# Patient Record
Sex: FEMALE | Race: OTHER | Employment: OTHER | URBAN - METROPOLITAN AREA
[De-identification: names, ages, dates, MRNs, and addresses within clinical notes are randomized per-mention and may not be internally consistent; named-entity substitution may affect disease eponyms.]

---

## 2020-06-29 ENCOUNTER — OFFICE VISIT (OUTPATIENT)
Dept: CARDIOLOGY CLINIC | Age: 70
End: 2020-06-29

## 2020-06-29 VITALS
HEIGHT: 62 IN | WEIGHT: 217 LBS | TEMPERATURE: 98.3 F | SYSTOLIC BLOOD PRESSURE: 126 MMHG | HEART RATE: 77 BPM | BODY MASS INDEX: 39.93 KG/M2 | DIASTOLIC BLOOD PRESSURE: 80 MMHG

## 2020-06-29 PROCEDURE — 99204 OFFICE O/P NEW MOD 45 MIN: CPT | Performed by: INTERNAL MEDICINE

## 2020-06-29 PROCEDURE — 93000 ELECTROCARDIOGRAM COMPLETE: CPT | Performed by: INTERNAL MEDICINE

## 2020-06-29 RX ORDER — CHLORTHALIDONE 25 MG/1
12.5 TABLET ORAL DAILY
COMMUNITY
End: 2020-08-25 | Stop reason: SDUPTHER

## 2020-06-29 RX ORDER — CLOPIDOGREL BISULFATE 75 MG/1
75 TABLET ORAL DAILY
COMMUNITY
End: 2020-07-30 | Stop reason: HOSPADM

## 2020-06-29 RX ORDER — AMLODIPINE BESYLATE 10 MG/1
10 TABLET ORAL DAILY
COMMUNITY
End: 2020-08-25

## 2020-06-29 RX ORDER — SPIRONOLACTONE 25 MG/1
12.5 TABLET ORAL DAILY
Qty: 45 TABLET | Refills: 3 | Status: SHIPPED | OUTPATIENT
Start: 2020-06-29 | End: 2020-08-25 | Stop reason: SDUPTHER

## 2020-06-29 RX ORDER — ATORVASTATIN CALCIUM 10 MG/1
10 TABLET, FILM COATED ORAL DAILY
COMMUNITY
End: 2020-08-25

## 2020-06-29 SDOH — HEALTH STABILITY: MENTAL HEALTH: HOW OFTEN DO YOU HAVE A DRINK CONTAINING ALCOHOL?: NEVER

## 2020-06-29 NOTE — PROGRESS NOTES
change, diaphoresis, fatigue, fever and unexpected weight change. HENT: Negative for congestion, facial swelling, mouth sores and nosebleeds. Eyes: Negative for discharge and visual disturbance. Respiratory: Negative for cough, chest tightness, shortness of breath and wheezing. Cardiovascular: Negative for chest pain, palpitations and leg swelling. Gastrointestinal: Negative for abdominal distention, abdominal pain, blood in stool and vomiting. Endocrine: Negative for cold intolerance, heat intolerance and polyuria. Genitourinary: Negative for difficulty urinating, dysuria, frequency and hematuria. Musculoskeletal: Negative for back pain, joint swelling, myalgias and neck pain. Skin: Negative for color change, pallor and rash. Allergic/Immunologic: Negative for immunocompromised state. Neurological: Negative for dizziness, syncope, weakness, light-headedness, numbness and headaches. Hematological: Negative for adenopathy. Does not bruise/bleed easily. Psychiatric/Behavioral: Negative for behavioral problems, confusion, decreased concentration and suicidal ideas. The patient is not nervous/anxious. Vitals:    06/29/20 1624   BP: 126/80   Pulse:    Temp:     Weight: 217 lb (98.4 kg)       Vitals:    06/29/20 1604 06/29/20 1624   BP: (!) 140/90 126/80   Site: Left Upper Arm Left Upper Arm   Position: Supine Sitting   Cuff Size: Large Adult Large Adult   Pulse: 77    Temp: 98.3 °F (36.8 °C)    Weight: 217 lb (98.4 kg)    Height: 5' 2\" (1.575 m)        BP Readings from Last 3 Encounters:   06/29/20 126/80       Wt Readings from Last 3 Encounters:   06/29/20 217 lb (98.4 kg)       Physical Exam  Constitutional:       General: She is not in acute distress. Appearance: She is well-developed. She is not diaphoretic. HENT:      Head: Normocephalic and atraumatic. Eyes:      Pupils: Pupils are equal, round, and reactive to light. Neck:      Musculoskeletal: Normal range of motion.

## 2020-06-30 PROBLEM — R60.0 LEG EDEMA: Status: ACTIVE | Noted: 2020-06-30

## 2020-06-30 PROBLEM — E78.2 MIXED HYPERLIPIDEMIA: Status: ACTIVE | Noted: 2020-06-30

## 2020-06-30 PROBLEM — I10 ESSENTIAL HYPERTENSION: Status: ACTIVE | Noted: 2020-06-30

## 2020-06-30 PROBLEM — I49.5 SSS (SICK SINUS SYNDROME) (HCC): Status: ACTIVE | Noted: 2020-06-30

## 2020-06-30 ASSESSMENT — ENCOUNTER SYMPTOMS
COUGH: 0
ABDOMINAL PAIN: 0
CHEST TIGHTNESS: 0
BLOOD IN STOOL: 0
WHEEZING: 0
COLOR CHANGE: 0
FACIAL SWELLING: 0
SHORTNESS OF BREATH: 0
ABDOMINAL DISTENTION: 0
BACK PAIN: 0
EYE DISCHARGE: 0
VOMITING: 0

## 2020-07-09 PROBLEM — Z95.0 ARTIFICIAL PACEMAKER: Status: ACTIVE | Noted: 2020-07-09

## 2020-07-10 ENCOUNTER — HOSPITAL ENCOUNTER (OUTPATIENT)
Dept: NON INVASIVE DIAGNOSTICS | Age: 70
Discharge: HOME | End: 2020-07-10

## 2020-07-10 ENCOUNTER — NURSE ONLY (OUTPATIENT)
Dept: CARDIOLOGY CLINIC | Age: 70
End: 2020-07-10

## 2020-07-10 LAB
LV EF: 35 %
LVEF MODALITY: NORMAL

## 2020-07-10 PROCEDURE — 93279 PRGRMG DEV EVAL PM/LDLS PM: CPT | Performed by: INTERNAL MEDICINE

## 2020-07-10 PROCEDURE — 93306 TTE W/DOPPLER COMPLETE: CPT

## 2020-07-10 NOTE — PROGRESS NOTES
Device interrogation by company representative. See interrogation for further details. Programmed vvir 60 bpm. Paced 79.5%. No  arrhythmias recorded. Pacer placed in [place] 3/30/17. carelink monitor ordered. See PACEART report under Cardiology tab.

## 2020-07-14 ENCOUNTER — PREP FOR PROCEDURE (OUTPATIENT)
Dept: CARDIOLOGY CLINIC | Age: 70
End: 2020-07-14

## 2020-07-14 RX ORDER — SODIUM CHLORIDE 0.9 % (FLUSH) 0.9 %
10 SYRINGE (ML) INJECTION PRN
Status: CANCELLED | OUTPATIENT
Start: 2020-07-14

## 2020-07-14 RX ORDER — ASPIRIN 81 MG/1
81 TABLET ORAL DAILY
Qty: 30 TABLET | Refills: 0 | Status: SHIPPED | OUTPATIENT
Start: 2020-07-14 | End: 2021-03-10 | Stop reason: SDUPTHER

## 2020-07-14 RX ORDER — SODIUM CHLORIDE 0.9 % (FLUSH) 0.9 %
10 SYRINGE (ML) INJECTION EVERY 12 HOURS SCHEDULED
Status: CANCELLED | OUTPATIENT
Start: 2020-07-14

## 2020-07-14 RX ORDER — ASPIRIN 325 MG
325 TABLET ORAL ONCE
Status: CANCELLED | OUTPATIENT
Start: 2020-07-30

## 2020-07-14 RX ORDER — SODIUM CHLORIDE 9 MG/ML
INJECTION, SOLUTION INTRAVENOUS CONTINUOUS
Status: CANCELLED | OUTPATIENT
Start: 2020-07-14

## 2020-07-14 NOTE — PROGRESS NOTES
Left Heart Catheterization    A left heart catheterization is a procedure that provides your cardiologist with detailed information regarding how your heart functions. A small catheter (long, fine tube) is inserted into an artery (a vessel that carries blood and oxygen) that leads to your heart. While watching with x-ray equipment, small amounts of dye are injected which enables visualization of the heart arteries and chambers. The pictures that your cardiologist receives from the cardiac catheterization enable him or her to decide on the best treatment for you. Date of the procedure:  7/30/20    Time of arrival: 0930    Cardiologist performing the procedure: _Sepideh_____________________    Instructions for your left heart catheterization:    1. Bring a list of your medications to the hospital.    2.  Please notify us before the procedure if you are allergic to anything; especially x-ray contrast dye, iodine, nickel, or any type of jewelry. This is very important! 3. Do not eat or drink anything at all after midnight (or 8 hours) prior to the procedure. 4.  Take all morning medications EXCEPT any diuretics (water pills) the day of the procedure with a small sip of water. 5.  If you are on Coumadin, Warfarin, or Jairon Factor, please notify us so that we can make adjustments to your medication. 6.  If you are taking Xarelto, Eliquis, or Pradaxa, please stop staking these medications two days prior to the procedure (including the day of the procedure). 7.  If you are diabetic, check your blood sugar in the morning. If your blood sugar is 120 or less, do not take insulin. If your blood sugar is more than 120, take half the dose of your normal insulin. Do not take Metformin the night before your procedure or morning of the procedure. 8.  You MUST have someone to drive you home--no driving for 24 hours after your procedure.   If an intervention is performed, you might stay overnight in the hospital.    9.  Discharge instructions will be given to you at the time of your procedure. 10.  For any questions or if you cannot keep this appointment for any reason, please call [phone]. Spoke with pt regarding procedure. Pre-op instructions, time and location of arrival discussed. Pt will get COVID test 5-6 days prior to procedure. Pt verbalized understanding and all questions answered at this time.

## 2020-07-24 ENCOUNTER — OFFICE VISIT (OUTPATIENT)
Dept: PRIMARY CARE CLINIC | Age: 70
End: 2020-07-24

## 2020-07-24 PROCEDURE — 99211 OFF/OP EST MAY X REQ PHY/QHP: CPT | Performed by: NURSE PRACTITIONER

## 2020-07-27 LAB
REPORT: NORMAL
SARS-COV-2: NOT DETECTED
THIS TEST SENT TO: NORMAL

## 2020-07-29 ENCOUNTER — PRE-PROCEDURE TELEPHONE (OUTPATIENT)
Dept: CARDIAC CATH/INVASIVE PROCEDURES | Age: 70
End: 2020-07-29

## 2020-07-30 ENCOUNTER — HOSPITAL ENCOUNTER (OUTPATIENT)
Dept: CARDIAC CATH/INVASIVE PROCEDURES | Age: 70
Discharge: HOME | End: 2020-08-01

## 2020-07-30 VITALS — BODY MASS INDEX: 40.12 KG/M2 | HEIGHT: 62 IN | TEMPERATURE: 98.9 F | WEIGHT: 218 LBS

## 2020-07-30 LAB
A/G RATIO: 1.7 (ref 1.1–2.2)
ALBUMIN SERPL-MCNC: 5 G/DL (ref 3.4–5)
ALP BLD-CCNC: 72 U/L (ref 40–129)
ALT SERPL-CCNC: 13 U/L (ref 10–40)
ANION GAP SERPL CALCULATED.3IONS-SCNC: 13 MMOL/L (ref 3–16)
AST SERPL-CCNC: 20 U/L (ref 15–37)
BILIRUB SERPL-MCNC: 2.8 MG/DL (ref 0–1)
BUN BLDV-MCNC: 17 MG/DL (ref 7–20)
CALCIUM SERPL-MCNC: 10.4 MG/DL (ref 8.3–10.6)
CHLORIDE BLD-SCNC: 99 MMOL/L (ref 99–110)
CO2: 28 MMOL/L (ref 21–32)
CREAT SERPL-MCNC: 1.1 MG/DL (ref 0.6–1.2)
EKG ATRIAL RATE: 88 BPM
EKG DIAGNOSIS: NORMAL
EKG P AXIS: 37 DEGREES
EKG Q-T INTERVAL: 454 MS
EKG QRS DURATION: 168 MS
EKG QTC CALCULATION (BAZETT): 503 MS
EKG R AXIS: 43 DEGREES
EKG T AXIS: -35 DEGREES
EKG VENTRICULAR RATE: 74 BPM
GFR AFRICAN AMERICAN: 60
GFR NON-AFRICAN AMERICAN: 49
GLOBULIN: 3 G/DL
GLUCOSE BLD-MCNC: 159 MG/DL (ref 70–99)
HCT VFR BLD CALC: 40.8 % (ref 36–48)
HEMOGLOBIN: 14.5 G/DL (ref 12–16)
INR BLD: 1.02 (ref 0.86–1.14)
LEFT VENTRICULAR EJECTION FRACTION MODE: NORMAL
LV EF: 40 %
MCH RBC QN AUTO: 34.3 PG (ref 26–34)
MCHC RBC AUTO-ENTMCNC: 35.4 G/DL (ref 31–36)
MCV RBC AUTO: 96.7 FL (ref 80–100)
PDW BLD-RTO: 13.1 % (ref 12.4–15.4)
PLATELET # BLD: 148 K/UL (ref 135–450)
PMV BLD AUTO: 7.7 FL (ref 5–10.5)
POTASSIUM SERPL-SCNC: 3.6 MMOL/L (ref 3.5–5.1)
PROTHROMBIN TIME: 11.8 SEC (ref 10–13.2)
RBC # BLD: 4.22 M/UL (ref 4–5.2)
SODIUM BLD-SCNC: 140 MMOL/L (ref 136–145)
TOTAL PROTEIN: 8 G/DL (ref 6.4–8.2)
WBC # BLD: 4.7 K/UL (ref 4–11)

## 2020-07-30 PROCEDURE — 6360000002 HC RX W HCPCS

## 2020-07-30 PROCEDURE — 99152 MOD SED SAME PHYS/QHP 5/>YRS: CPT

## 2020-07-30 PROCEDURE — 93005 ELECTROCARDIOGRAM TRACING: CPT | Performed by: INTERNAL MEDICINE

## 2020-07-30 PROCEDURE — C1894 INTRO/SHEATH, NON-LASER: HCPCS

## 2020-07-30 PROCEDURE — 85610 PROTHROMBIN TIME: CPT

## 2020-07-30 PROCEDURE — 2500000003 HC RX 250 WO HCPCS

## 2020-07-30 PROCEDURE — 99217 PR OBSERVATION CARE DISCHARGE MANAGEMENT: CPT | Performed by: NURSE PRACTITIONER

## 2020-07-30 PROCEDURE — 93010 ELECTROCARDIOGRAM REPORT: CPT | Performed by: INTERNAL MEDICINE

## 2020-07-30 PROCEDURE — 80053 COMPREHEN METABOLIC PANEL: CPT

## 2020-07-30 PROCEDURE — 85027 COMPLETE CBC AUTOMATED: CPT

## 2020-07-30 PROCEDURE — 93458 L HRT ARTERY/VENTRICLE ANGIO: CPT | Performed by: INTERNAL MEDICINE

## 2020-07-30 PROCEDURE — 99152 MOD SED SAME PHYS/QHP 5/>YRS: CPT | Performed by: INTERNAL MEDICINE

## 2020-07-30 PROCEDURE — 93458 L HRT ARTERY/VENTRICLE ANGIO: CPT

## 2020-07-30 PROCEDURE — C1769 GUIDE WIRE: HCPCS

## 2020-07-30 PROCEDURE — 2709999900 HC NON-CHARGEABLE SUPPLY

## 2020-07-30 PROCEDURE — 6360000004 HC RX CONTRAST MEDICATION: Performed by: INTERNAL MEDICINE

## 2020-07-30 RX ORDER — SODIUM CHLORIDE 0.9 % (FLUSH) 0.9 %
10 SYRINGE (ML) INJECTION EVERY 12 HOURS SCHEDULED
Status: DISCONTINUED | OUTPATIENT
Start: 2020-07-30 | End: 2020-07-30 | Stop reason: SDUPTHER

## 2020-07-30 RX ORDER — SODIUM CHLORIDE 9 MG/ML
INJECTION, SOLUTION INTRAVENOUS CONTINUOUS
Status: DISCONTINUED | OUTPATIENT
Start: 2020-07-30 | End: 2020-08-02 | Stop reason: HOSPADM

## 2020-07-30 RX ORDER — SODIUM CHLORIDE 0.9 % (FLUSH) 0.9 %
10 SYRINGE (ML) INJECTION EVERY 12 HOURS SCHEDULED
Status: DISCONTINUED | OUTPATIENT
Start: 2020-07-30 | End: 2020-08-02 | Stop reason: HOSPADM

## 2020-07-30 RX ORDER — SODIUM CHLORIDE 0.9 % (FLUSH) 0.9 %
10 SYRINGE (ML) INJECTION PRN
Status: DISCONTINUED | OUTPATIENT
Start: 2020-07-30 | End: 2020-08-02 | Stop reason: HOSPADM

## 2020-07-30 RX ORDER — SODIUM CHLORIDE 0.9 % (FLUSH) 0.9 %
10 SYRINGE (ML) INJECTION PRN
Status: DISCONTINUED | OUTPATIENT
Start: 2020-07-30 | End: 2020-07-30 | Stop reason: SDUPTHER

## 2020-07-30 RX ORDER — ATROPINE SULFATE 0.4 MG/ML
0.5 AMPUL (ML) INJECTION
Status: ACTIVE | OUTPATIENT
Start: 2020-07-30 | End: 2020-07-30

## 2020-07-30 RX ORDER — ASPIRIN 325 MG
325 TABLET ORAL ONCE
Status: DISCONTINUED | OUTPATIENT
Start: 2020-07-30 | End: 2020-08-02 | Stop reason: HOSPADM

## 2020-07-30 RX ORDER — LOSARTAN POTASSIUM 50 MG/1
50 TABLET ORAL DAILY
Qty: 30 TABLET | Refills: 3 | Status: SHIPPED | OUTPATIENT
Start: 2020-07-30 | End: 2020-08-25 | Stop reason: SDUPTHER

## 2020-07-30 RX ORDER — ACETAMINOPHEN 325 MG/1
650 TABLET ORAL EVERY 4 HOURS PRN
Status: DISCONTINUED | OUTPATIENT
Start: 2020-07-30 | End: 2020-08-02 | Stop reason: HOSPADM

## 2020-07-30 RX ORDER — SODIUM CHLORIDE 9 MG/ML
INJECTION, SOLUTION INTRAVENOUS CONTINUOUS
Status: ACTIVE | OUTPATIENT
Start: 2020-07-30 | End: 2020-07-30

## 2020-07-30 RX ADMIN — IOHEXOL 200 ML: 350 INJECTION, SOLUTION INTRAVENOUS at 16:07

## 2020-07-30 NOTE — PROCEDURES
Allen Leal  72 y.o.  [id number]    Referring MD:  Dr. Sandie Ervin    Indication:  MCNEILL    Mallampati Class: 3    ASA Class: 3    After informed consent was obtained and history and exam reviewed, the patient was taken to the cardiac cath lab. The patient had both groins and the right wrist prepped and draped in sterile fashion. 1% Xylocaine was used to anesthetize the right wrist.  A needle was inserted into the radial artery and a 5/6 Fr sheath was inserted. Continuous pulse-oximetry and ECG monitoring was performed, and frequent blood pressure assessment was performed. An independent trained observer pushed medications at my direction. We monitored the patient's level of consciousness and vital signs/physiologic status throughout the procedure duration (see start and stop times below). A JR4 catheter was advanced through the sheath over a guide wire and advanced under fluoroscopic guidance into the ascending aorta. The wire was removed and the right coronary artery was engaged. Digital angiograms were recorded. The catheter was then removed and a JL 3.5 catheter was then advanced over the wire into the ascending aorta. The wire was removed, and the left main artery was engaged. Digital angiograms were recorded. The catheter was then removed, and a pigtail catheter was advanced over the wire to the ascending aorta. The pigtail catheter was then placed into the left ventricle. Pressures were recorded. Ventriculography was performed. Post-ventriculography pressures and a pullback were performed. The pigtail catheter was then removed. All catheter exchanges done over a wire. A femoral angiogram was not performed.     Hemostasis was obtained by VascBand    Complications: None    Estimated blood loss: < 30 mL    Sedation: 1 mg Versed, 25 mcg Fentanyl  Sedation start: 1547  Sedation stop: 1605    Angiographic Findings:  Co dominant system  Left Main:  Normal    Left Anterior Descending:  Mild irregular plaque    Circumflex:  Mild irregular plaque    Right Coronary:  Mild irregular plaque    Left Ventriculogram:  LVEF ~40%. Distal anterior wall/apical dysfunction    Hemodynamics (mm Hg):  Left Ventricular Pressure:  143/2, 3  Central Aortic Pressure:  147/56 (87)    Conclusions:  -No obstructive CAD  -LV dysfunction with LVEF ~40% or so. Distal anterior/apical wall motion abnormality    Recommendations:  -Maximize medical therapy    Oli Malik MD, [facility]

## 2020-07-30 NOTE — DISCHARGE SUMMARY
[facility] Discharge Note      Patient ID: Kirill Solorzano 72 y.o. 1950    Admission Date: 7/30/2020     Discharge Date:  7/30/2020    Reason for Admission:  Principal Diagnosis: LV dysfunction   Secondary Diagnosis: HTN    Procedures:  Coronary angiogram     Brief Summary of Patient's Course:  72 y.o. with LV dysfunction, MCNEILL, SSS and PPM who presented for coronary angiogram. Coronary angiogram demonstrated no obstructive CAD. The LVEF was ~40% with distal, anterior/apical wall motion abnormality. She tolerated the procedure well. A TR band was used to obtain hemostasis of the right radial arteriotomy. Losartan 50 mg po daily was started. Instructed pt to check BMP in 1 week. She was monitored in the holding bay and discharged home. Discharge Condition:  Good     Discharge Instructions: Activity Limitations:  No heavy lifting. Diet:  low fat and low sodium    Follow Up Instructions: To office in: Follow up with Zohra Araujo NP in 1 week and Dr Venancio Felix in 4 weeks  Instructed Re: Discharge medications, activity and dietary restrictions and follow up appointments were discussed.      Discharge Medications:   Dilia Seals   Home Medication Instructions JAT:[id number]    Printed on:07/30/20 7760   Medication Information                      amLODIPine (NORVASC) 10 MG tablet  Take 10 mg by mouth daily             aspirin EC 81 MG EC tablet  Take 1 tablet by mouth daily             atorvastatin (LIPITOR) 10 MG tablet  Take 10 mg by mouth daily             chlorthalidone (HYGROTON) 25 MG tablet  Take 12.5 mg by mouth daily             losartan (COZAAR) 50 MG tablet  Take 1 tablet by mouth daily             spironolactone (ALDACTONE) 25 MG tablet  Take 0.5 tablets by mouth daily               Check BMP in 1 week                          Attending Physician: Dr. Georgi Silvestre   Time Spent on Discharge: greater than 30 minutes

## 2020-07-30 NOTE — PLAN OF CARE
Brief Pre-Op Note/Sedation Assessment      Jose Alfredo Cuevas  1950  Room/bed info not found      [id number]  3:38 PM    Planned Procedure: Cardiac Catheterization Procedure    Post Procedure Plan: Return to same level of care    Consent: I have discussed with the patient and/or the patient representative the indication, alternatives, and the possible risks and/or complications of the planned procedure and the anesthesia methods. The patient and/or patient representative appear to understand and agree to proceed. Chief Complaint: Dyspnea on Exertion      Indications for Cath Procedure:  Suspected CAD and LV Dysfunction  Anginal Classification within 2 weeks:  CCS III - Symptoms with everyday living activities, i.e., moderate limitation(if MCNEILL is anginal equivalent)  NYHA Heart Failure Class within 2 weeks: Class II - Symptoms of HF on ordinary exertion, Newly Diagnosed?  No, Heart Failure Type: Systolic  Is Cath Lab Visit Valve-related?: No  Surgical Risk: N/A  Functional Type: >= 4 METS without symptoms    Anti- Anginal Meds within 2 weeks:   Yes: Ca Channel Blockers, Aspirin and Statin (Any)    Stress or Imaging Studies Performed:  None     Vital Signs:  Temp 98.9 °F (37.2 °C) (Oral)   Ht 5' 2\" (1.575 m)   Wt 218 lb (98.9 kg)   BMI 39.87 kg/m²     Allergies:  No Known Allergies    Past Medical History:  Past Medical History:   Diagnosis Date    Hyperlipidemia     Hypertension          Surgical History:  Past Surgical History:   Procedure Laterality Date    PACEMAKER PLACEMENT           Medications:  Current Outpatient Medications   Medication Sig Dispense Refill    aspirin EC 81 MG EC tablet Take 1 tablet by mouth daily 30 tablet 0    amLODIPine (NORVASC) 10 MG tablet Take 10 mg by mouth daily      chlorthalidone (HYGROTON) 25 MG tablet Take 12.5 mg by mouth daily      atorvastatin (LIPITOR) 10 MG tablet Take 10 mg by mouth daily      spironolactone (ALDACTONE) 25 MG tablet Take 0.5 tablets by

## 2020-07-30 NOTE — H&P
72 y.o. here for [facility]. Has PPM and SSS. Had recent echo that showed LV dysfunction with RWMA. She was referred for cath. Past Medical History:   Diagnosis Date    Hyperlipidemia     Hypertension      Past Surgical History:   Procedure Laterality Date    PACEMAKER PLACEMENT       Social History     Tobacco Use    Smoking status: Never Smoker    Smokeless tobacco: Never Used   Substance Use Topics    Alcohol use: Never     Frequency: Never    Drug use: Not on file     No Known Allergies    No family history on file. PE:  Temperature 98.9 °F (37.2 °C), temperature source Oral, height 5' 2\" (1.575 m), weight 218 lb (98.9 kg). General (appearance): Well devel. No distress  Eyes: Anicteric  Neck: Supple. No JVD  Ears/Nose/Mouth/Thorat: No cyanosis  CV: RRR. Respiratory:  Clear, normal respiratory effort  GI: obese  Skin: Warm, dry. No rashes  Neuro/Psych: Alert and oriented x 3. Appropriate behavior  Ext:  No c/c. No pitting edema  Pulses:  2+ radial    Lab Results   Component Value Date    WBC 4.7 07/30/2020    HGB 14.5 07/30/2020    HCT 40.8 07/30/2020    MCV 96.7 07/30/2020     07/30/2020     Lab Results   Component Value Date     07/30/2020    K 3.6 07/30/2020    CL 99 07/30/2020    CO2 28 07/30/2020    BUN 17 07/30/2020    CREATININE 1.1 07/30/2020    GLUCOSE 159 (H) 07/30/2020    CALCIUM 10.4 07/30/2020    PROT 8.0 07/30/2020    LABALBU 5.0 07/30/2020    BILITOT 2.8 (H) 07/30/2020    ALKPHOS 72 07/30/2020    AST 20 07/30/2020    ALT 13 07/30/2020    LABGLOM 49 (A) 07/30/2020    GFRAA 60 (A) 07/30/2020    AGRATIO 1.7 07/30/2020    GLOB 3.0 07/30/2020     Lab Results   Component Value Date    INR 1.02 07/30/2020    PROTIME 11.8 07/30/2020 7/2020 Echo:  Left ventricular cavity size is normal. There is mild concentric left   ventricular hypertrophy. Overall left ventricular systolic function appears   moderately reduced with an ejection fraction of 35%. Abnormal setpal motion.    The apical septum and true apex are severely hypo- to akinetic. The apical   lateral wall is severely hypo- to akinetic. The anterolateral wall is mildly   hypokinetic. The anterior wall appears hypokinetic. Mild mitral regurgitation is present. Mild tricuspid regurgitation. Estimated pulmonary artery systolic pressure is at 27 mmHg assuming a right   atrial pressure of 3 mmHg. A/P:  72 y.o. here for cath. Chronic systolic chf  SSS s/p ppm  CAD (has h/o stent, she says)    Plan:  [facility] today    Marcelo Delarosa MD, [facility]

## 2020-08-06 DIAGNOSIS — I51.9 LV DYSFUNCTION: ICD-10-CM

## 2020-08-06 LAB
ANION GAP SERPL CALCULATED.3IONS-SCNC: 13 MMOL/L (ref 3–16)
BUN BLDV-MCNC: 14 MG/DL (ref 7–20)
CALCIUM SERPL-MCNC: 10.3 MG/DL (ref 8.3–10.6)
CHLORIDE BLD-SCNC: 103 MMOL/L (ref 99–110)
CO2: 27 MMOL/L (ref 21–32)
CREAT SERPL-MCNC: 1.1 MG/DL (ref 0.6–1.2)
GFR AFRICAN AMERICAN: 60
GFR NON-AFRICAN AMERICAN: 49
GLUCOSE BLD-MCNC: 112 MG/DL (ref 70–99)
POTASSIUM SERPL-SCNC: 4 MMOL/L (ref 3.5–5.1)
SODIUM BLD-SCNC: 143 MMOL/L (ref 136–145)

## 2020-08-24 NOTE — PROGRESS NOTES
[address]   Cardiac Electrophysiology Consultation   Date: 8/25/2020  Reason for Consultation:  SSS, CMP  Consult Requesting Physician: No att. providers found     Chief Complaint:   Chief Complaint   Patient presents with    New Patient     Dr. Maty George       HPI: Tere Maldonado is a 72 y.o. female referred by Dr. Eller Yosef for [facility] [address]. PMH significant for HTN, HLD, CHF, SSS s/p MDT single chamber PPM placed in [place] (3/30/17). Pt moved from [place] in 2/2020 and has established cardiology care in our office. Echo (7/2020) showed EF of 35% and RWMA, s/p LHC showed no obstructive CAD. Currently on valsartan and spironolactone, but no BB. Interval History: Today, she is being seen as a new patient for management of CMP and device. She is here today with her son. She had planned on going back to [place] last month, but her trip was cancelled. Her son is working toward her staying in the [phone]. He also plans on adding her to his insurance. She does report that she had trouble with SOB last month, but she has been feeling better lately. She also confirms occasional swelling in her LE. Device interrogation today shows normally functioning PPM with stable sensing and pacing thresholds.  98%. No atrial or ventricular arrhythmias noted. VEL at 5 yrs. Past Medical History:   Diagnosis Date    Hyperlipidemia     Hypertension         Past Surgical History:   Procedure Laterality Date    PACEMAKER PLACEMENT         Allergies:  No Known Allergies    Medication:   Prior to Admission medications    Medication Sig Start Date End Date Taking?  Authorizing Provider   losartan (COZAAR) 50 MG tablet Take 1 tablet by mouth daily 7/30/20  Yes SAMMY Mccarty - CNP   aspirin EC 81 MG EC tablet Take 1 tablet by mouth daily 7/14/20  Yes Jaden Chester MD   amLODIPine (NORVASC) 10 MG tablet Take 10 mg by mouth daily   Yes Historical Provider, MD   chlorthalidone (HYGROTON) 25 MG tablet Take 12.5 mg by mouth daily   Yes Historical Provider, MD   atorvastatin (LIPITOR) 10 MG tablet Take 10 mg by mouth daily   Yes Historical Provider, MD   spironolactone (ALDACTONE) 25 MG tablet Take 0.5 tablets by mouth daily 6/29/20  Yes Aristides Boone MD       Social History:   reports that she has never smoked. She has never used smokeless tobacco. She reports that she does not drink alcohol. Family History:  family history is not on file. Reviewed. Denies family history of sudden cardiac death, arrhythmia, premature CAD    Review of System:    · General ROS: negative for - chills, fever   · Psychological ROS: negative for - anxiety or depression  · Ophthalmic ROS: negative for - eye pain or loss of vision  · ENT ROS: negative for - epistaxis, headaches, nasal discharge, sore throat   · Allergy and Immunology ROS: negative for - hives, nasal congestion   · Hematological and Lymphatic ROS: negative for - bleeding problems, blood clots, bruising or jaundice  · Endocrine ROS: negative for - skin changes, temperature intolerance or unexpected weight changes  · Respiratory ROS: negative for - cough, hemoptysis, pleuritic pain, SOB, sputum changes or wheezing  · Cardiovascular ROS: Per HPI. · Gastrointestinal ROS: negative for - abdominal pain, blood in stools, diarrhea, hematemesis, melena, nausea/vomiting or swallowing difficulty/pain  · Genito-Urinary ROS: negative for - dysuria or incontinence  · Musculoskeletal ROS: negative for - joint swelling or muscle pain  · Neurological ROS: negative for - confusion, dizziness, gait disturbance, headaches, numbness/tingling, seizures, speech problems, tremors, visual changes or weakness  · Dermatological ROS: negative for - rash    Physical Examination:  Vitals:    08/25/20 1038   BP: 110/70   Pulse: 85   Temp: 97.3 °F (36.3 °C)       · Constitutional: Oriented. No distress. · Head: Normocephalic and atraumatic.    · Mouth/Throat: Oropharynx is clear and moist.   · Eyes: Conjunctivae normal. EOM are normal.   · Neck: Normal range of motion. Neck supple. No rigidity. No JVD present. · Cardiovascular: Normal rate, regular rhythm, S1&S2 and intact distal pulses. · Pulmonary/Chest: Bilateral respiratory sounds. No wheezes. No rhonchi. · Abdominal: Soft. Bowel sounds present. No distension, No tenderness. · Musculoskeletal: No tenderness. No edema    · Lymphadenopathy: Has no cervical adenopathy. · Neurological: Alert and oriented. Cranial nerve appears intact, No Gross deficit   · Skin: Skin is warm and dry. No rash noted. · Psychiatric: Has a normal mood, affect and behavior     Labs:  Reviewed. ECG: reviewed, , with QRS duration 152 ms. No pathologic Q waves, ventricular pre-excitation, or QT prolongation. Studies:   1. Event monitor:     2. Echo 7/10/20:   Summary   Left ventricular cavity size is normal. There is mild concentric left   ventricular hypertrophy. Overall left ventricular systolic function appears   moderately reduced with an ejection fraction of 35%. Abnormal setpal motion. The apical septum and true apex are severely hypo- to akinetic. The apical   lateral wall is severely hypo- to akinetic. The anterolateral wall is mildly   hypokinetic. The anterior wall appears hypokinetic. Mild mitral regurgitation is present. Mild tricuspid regurgitation. Estimated pulmonary artery systolic pressure is at 27 mmHg assuming a right   atrial pressure of 3 mmHg. 3. Stress Test:      4. Cath 7/30/20 (Dr. Leonel Nur): Conclusions:  -No obstructive CAD  -LV dysfunction with LVEF ~40% or so. Distal anterior/apical wall motion abnormality    The MCOT, echocardiogram, stress test, and coronary angiography/PCI were reviewed by myself and used for my plan of care. Procedures:  1. None    Assessment/Plan:  1.  SSS, s/p single chamber PPM 3/30/17  2. NICMP, possibly PPM induced?, EF 35%, on ARB  3. HTN, on losartan, chlorthalidone, and amlodipine  4.   HLD, on statin    Her underlying rhythm is complete heart block  PPM is functioning properly with high  of 98%  Cannot make any changes in her device today to minimize  as her underlying rhythm is CHB. She is symptomatic with intermittent s/s of HF  As she is currently self pay, will save any costly treatments until insurance has been established. In the meantime, will start Toprol XL 50 mg daily. Will stop amlodipine today  Will change from atorvastatin to simvastatin 40 mg due to cost  Continue losartan 50 mg daily, spironolactone 12.5 mg daily, and chlorthalidone 12.5 mg daily    After optimal medical therapy, if she continues to have low LV ejection fraction, then we will have to discuss about upgrading the device to CRT P / D based on her LVEF  The CIED was interrogated and programmed and I supervised and reviewed all the data. All findings and changes are in device interrogation sheat and reflect my personal interpretation and changes and is scanned to Epic. - The patient is counseled to follow a low salt diet to assure blood pressure remains controlled for cardiovascular risk factor modification.   - The patient is counseled to avoid excess caffeine, and energy drinks as this may exacerbated ectopy and arrhythmia. - The patient is counseled to get regular exercise 3-5 times per week to control cardiovascular risk factors. - The patient is counseled to lose weigt to control cardiovascular risk factors. -The patient is counseled about the health hazards of smoking including cardiovascular side effects and its impact on morbidity and mortality. Follow up in 3 months with me    Thank you for allowing me to participate in the care of [facility] [place]. All questions and concerns were addressed to the patient/family. Alternatives to my treatment were discussed. Prince Sheryl RN, am scribing for and in the presence of Dr. Analia Wilson.  08/25/20 10:43 AM  Valerio Lamas, DL Espinoza MD, personally performed the services prescribed in this documentation as scribed by Ms. Milvia Cavazos RN in my presence and it is both accurate and complete.        Brando Villavicencio MD  Cardiac Electrophysiology  [facility]

## 2020-08-25 ENCOUNTER — OFFICE VISIT (OUTPATIENT)
Dept: CARDIOLOGY CLINIC | Age: 70
End: 2020-08-25

## 2020-08-25 ENCOUNTER — NURSE ONLY (OUTPATIENT)
Dept: CARDIOLOGY CLINIC | Age: 70
End: 2020-08-25

## 2020-08-25 VITALS
HEART RATE: 85 BPM | WEIGHT: 216.8 LBS | DIASTOLIC BLOOD PRESSURE: 70 MMHG | BODY MASS INDEX: 39.65 KG/M2 | TEMPERATURE: 97.3 F | SYSTOLIC BLOOD PRESSURE: 110 MMHG

## 2020-08-25 PROCEDURE — 99203 OFFICE O/P NEW LOW 30 MIN: CPT | Performed by: INTERNAL MEDICINE

## 2020-08-25 PROCEDURE — 93279 PRGRMG DEV EVAL PM/LDLS PM: CPT | Performed by: INTERNAL MEDICINE

## 2020-08-25 RX ORDER — CHLORTHALIDONE 25 MG/1
12.5 TABLET ORAL DAILY
Qty: 45 TABLET | Refills: 3 | Status: SHIPPED | OUTPATIENT
Start: 2020-08-25 | End: 2020-12-03 | Stop reason: SDUPTHER

## 2020-08-25 RX ORDER — METOPROLOL SUCCINATE 50 MG/1
50 TABLET, EXTENDED RELEASE ORAL DAILY
Qty: 30 TABLET | Refills: 5 | Status: SHIPPED | OUTPATIENT
Start: 2020-08-25 | End: 2020-08-25

## 2020-08-25 RX ORDER — SIMVASTATIN 40 MG
40 TABLET ORAL NIGHTLY
Qty: 90 TABLET | Refills: 3 | Status: SHIPPED | OUTPATIENT
Start: 2020-08-25 | End: 2020-12-03 | Stop reason: SDUPTHER

## 2020-08-25 RX ORDER — METOPROLOL SUCCINATE 50 MG/1
50 TABLET, EXTENDED RELEASE ORAL DAILY
Qty: 90 TABLET | Refills: 3 | Status: SHIPPED | OUTPATIENT
Start: 2020-08-25 | End: 2020-12-03 | Stop reason: SDUPTHER

## 2020-08-25 RX ORDER — SPIRONOLACTONE 25 MG/1
12.5 TABLET ORAL DAILY
Qty: 45 TABLET | Refills: 3 | Status: SHIPPED | OUTPATIENT
Start: 2020-08-25 | End: 2020-12-03 | Stop reason: SDUPTHER

## 2020-08-25 RX ORDER — LOSARTAN POTASSIUM 50 MG/1
50 TABLET ORAL DAILY
Qty: 90 TABLET | Refills: 3 | Status: SHIPPED | OUTPATIENT
Start: 2020-08-25 | End: 2020-12-03 | Stop reason: SDUPTHER

## 2020-08-25 NOTE — PROGRESS NOTES
Device interrogation by company representative. See interrogation for further details. Patient to see Dr. Maryuri Hartman today. SC-PM.  98.1%. no VHR recordings. Follow up in 3 months via [employer]. See PACEART report under Cardiology tab.

## 2020-09-10 ENCOUNTER — OFFICE VISIT (OUTPATIENT)
Dept: CARDIOLOGY CLINIC | Age: 70
End: 2020-09-10

## 2020-09-10 VITALS — BODY MASS INDEX: 39.73 KG/M2 | HEART RATE: 97 BPM | TEMPERATURE: 97.5 F | OXYGEN SATURATION: 98 % | WEIGHT: 217.2 LBS

## 2020-09-10 PROCEDURE — 99214 OFFICE O/P EST MOD 30 MIN: CPT | Performed by: INTERNAL MEDICINE

## 2020-09-10 ASSESSMENT — ENCOUNTER SYMPTOMS
ABDOMINAL DISTENTION: 0
FACIAL SWELLING: 0
COLOR CHANGE: 0
COUGH: 0
ABDOMINAL PAIN: 0
EYE DISCHARGE: 0
WHEEZING: 0
CHEST TIGHTNESS: 0
VOMITING: 0
BLOOD IN STOOL: 0
BACK PAIN: 0
SHORTNESS OF BREATH: 0

## 2020-09-10 NOTE — PROGRESS NOTES
[address] [facility]     Outpatient Cardiology         Chief Complaint   Patient presents with    Follow-Up from [address] a 72 y.o. female here for CHF follow up. Hx of HTN, PPM single chamber placed on 4/30/2017, CHF. Chronic systolic heart failure, compensated. Will reassess LV function in 3 months, if it has not improved will defer to EP upgrading to CRT P/D  HTN, on meds, no side effect. HLD, statin, tolerating well. SSS, pacemaker working properly      PMH  Past Medical History:   Diagnosis Date    Hyperlipidemia     Hypertension        PSH  Past Surgical History:   Procedure Laterality Date    PACEMAKER PLACEMENT          Social HIstory  Social History     Tobacco Use    Smoking status: Never Smoker    Smokeless tobacco: Never Used   Substance Use Topics    Alcohol use: Never     Frequency: Never    Drug use: Not on file       Family History  History reviewed. No pertinent family history. Allergies   No Known Allergies    Medications:     Home Medications:  Were reviewed and are listed in nursing record. and/or listed below    Prior to Admission medications    Medication Sig Start Date End Date Taking?  Authorizing Provider   losartan (COZAAR) 50 MG tablet Take 1 tablet by mouth daily 8/25/20  Yes Ap De La Torre MD   spironolactone (ALDACTONE) 25 MG tablet Take 0.5 tablets by mouth daily 8/25/20  Yes Ap De La Torre MD   simvastatin (ZOCOR) 40 MG tablet Take 1 tablet by mouth nightly 8/25/20  Yes Ap De La Torre MD   metoprolol succinate (TOPROL XL) 50 MG extended release tablet Take 1 tablet by mouth daily 8/25/20  Yes Ap De La Torre MD   chlorthalidone (HYGROTON) 25 MG tablet Take 0.5 tablets by mouth daily 8/25/20  Yes Ap De La Torre MD   aspirin EC 81 MG EC tablet Take 1 tablet by mouth daily 7/14/20  Yes Jaden Chester MD        Review of Systems   Constitutional: Negative for activity change, appetite change, diaphoresis, fatigue, fever and unexpected weight change. HENT: Negative for congestion, facial swelling, mouth sores and nosebleeds. Eyes: Negative for discharge and visual disturbance. Respiratory: Negative for cough, chest tightness, shortness of breath and wheezing. Cardiovascular: Negative for chest pain, palpitations and leg swelling. Gastrointestinal: Negative for abdominal distention, abdominal pain, blood in stool and vomiting. Endocrine: Negative for cold intolerance, heat intolerance and polyuria. Genitourinary: Negative for difficulty urinating, dysuria, frequency and hematuria. Musculoskeletal: Negative for back pain, joint swelling, myalgias and neck pain. Skin: Negative for color change, pallor and rash. Allergic/Immunologic: Negative for immunocompromised state. Neurological: Negative for dizziness, syncope, weakness, light-headedness, numbness and headaches. Hematological: Negative for adenopathy. Does not bruise/bleed easily. Psychiatric/Behavioral: Negative for behavioral problems, confusion, decreased concentration and suicidal ideas. The patient is not nervous/anxious. Vitals:    09/10/20 1424   Pulse: 97   Temp: 97.5 °F (36.4 °C)   SpO2: 98%    Weight: 217 lb 3.2 oz (98.5 kg)       Vitals:    09/10/20 1424   Pulse: 97   Temp: 97.5 °F (36.4 °C)   SpO2: 98%   Weight: 217 lb 3.2 oz (98.5 kg)       BP Readings from Last 3 Encounters:   08/25/20 110/70   06/29/20 126/80       Wt Readings from Last 3 Encounters:   09/10/20 217 lb 3.2 oz (98.5 kg)   08/25/20 216 lb 12.8 oz (98.3 kg)   07/30/20 218 lb (98.9 kg)       Physical Exam  Constitutional:       General: She is not in acute distress. Appearance: She is well-developed. She is not diaphoretic. HENT:      Head: Normocephalic and atraumatic. Eyes:      Pupils: Pupils are equal, round, and reactive to light. Neck:      Musculoskeletal: Normal range of motion. VLDL  No results found for: [facility]    Lab Results   Component Value Date    INR 1.02 07/30/2020    PROTIME 11.8 07/30/2020       The ASCVD Risk score (Antoni Brasher, et al., 2013) failed to calculate for the following reasons:    Cannot find a previous HDL lab    Cannot find a previous total cholesterol lab    Unable to determine if patient is Non-       Imaging:       Last ECG (if available):  Paced    Last Monitor/Holter    Last Stress (if available):    Last Cath (if available): 7/30/20  Angiographic Findings:  Co dominant system  Left Main:  Normal     Left Anterior Descending:  Mild irregular plaque     Circumflex:  Mild irregular plaque     Right Coronary:  Mild irregular plaque     Left Ventriculogram:  LVEF ~40%. Distal anterior wall/apical dysfunction     Hemodynamics (mm Hg):  Left Ventricular Pressure:  143/2, 3  Central Aortic Pressure:  147/56 (87)     Conclusions:  -No obstructive CAD  -LV dysfunction with LVEF ~40% or so. Distal anterior/apical wall motion abnormality    Last TTE/JANIE(if available): 7/10/20   Summary   Left ventricular cavity size is normal. There is mild concentric left   ventricular hypertrophy. Overall left ventricular systolic function appears   moderately reduced with an ejection fraction of 35%. Abnormal setpal motion. The apical septum and true apex are severely hypo- to akinetic. The apical   lateral wall is severely hypo- to akinetic. The anterolateral wall is mildly   hypokinetic. The anterior wall appears hypokinetic. Mild mitral regurgitation is present. Mild tricuspid regurgitation. Estimated pulmonary artery systolic pressure is at 27 mmHg assuming a right   atrial pressure of 3 mmHg. Last CMR  (if available):      Assessment / Plan:     SSS (sick sinus syndrome) ([address]  V paced. Follows up with EP. Device working properly. Mixed hyperlipidemia  On a statin. Essential hypertension  Controlled on current medications.     LV dysfunction  She does have chronic systolic heart failure, nonischemic in nature. She is currently on losartan, metoprolol. Compensated. In 3 months will need recheck EF for possible device upgrade. I had the opportunity to review the clinical symptoms and presentation of Matt Daigle. Patient's allergies and medications were reviewed and updated. Patient's past medical, surgical, social and family history were reviewed and updated. Patient's testing including laboratory, ECGs, monitor, imaging (TTE,JANIE,CMR,cath) were reviewed. Tobacco use was discussed with the patient and educated on the negative effects. I have asked the patient to not utilize these agents. All questions and concerns were addressed to the patient/family. Alternatives to my treatment were discussed. The note was completed using EMR. Every effort wasmade to ensure accuracy; however, inadvertent computerized transcription errors may be present. Thank you for allowing me to participate in thecare or Errol Redman MD, [facility], [facility]

## 2020-09-10 NOTE — ASSESSMENT & PLAN NOTE
She does have chronic systolic heart failure, nonischemic in nature. She is currently on losartan, metoprolol. Compensated. In 3 months will need recheck EF for possible device upgrade.

## 2020-11-03 ENCOUNTER — NURSE ONLY (OUTPATIENT)
Dept: CARDIOLOGY CLINIC | Age: 70
End: 2020-11-03

## 2020-11-03 PROCEDURE — 93294 REM INTERROG EVL PM/LDLS PM: CPT | Performed by: INTERNAL MEDICINE

## 2020-11-03 PROCEDURE — 93296 REM INTERROG EVL PM/IDS: CPT | Performed by: INTERNAL MEDICINE

## 2020-11-03 NOTE — LETTER
[address] [facility] [phone]  [address] [facility]    Pacemaker/Defibrillator Clinic          11/03/20        Peterson Ignacio  [address] [facility] [address]        Dear Peterson Ignacio    This letter is to inform you that we received the transmission from your monitor at home that checks your implanted heart device. The next date you should transmit will be 3/2/2021. If your report requires attention, we will notify you. Please be aware that the remote device transmission sites are periodically monitored only during regular business hours during which simultaneous in-office device clinics are being run. If your transmission requires attention, we will contact you as soon as possible. Thank you.             [facility]

## 2020-11-04 NOTE — PROGRESS NOTES
We received remote transmission from patient's monitor at home. Transmission shows normal sensing and pacing function. EP physician will review. See interrogation under cardiology tab in the [facility] [address] field for more details. No new arrhythmias.  99.9%. Follow up in 3 months via carelink.

## 2020-12-02 NOTE — PROGRESS NOTES
tablet Take 0.5 tablets by mouth daily 12/3/20  Yes Sirisha Ang MD   aspirin EC 81 MG EC tablet Take 1 tablet by mouth daily 7/14/20  Yes Twanda Canavan, MD       Social History:   reports that she has never smoked. She has never used smokeless tobacco. She reports that she does not drink alcohol. Family History:  family history is not on file. Reviewed. Denies family history of sudden cardiac death, arrhythmia, premature CAD    Review of System:    · General ROS: negative for - chills, fever   · Psychological ROS: negative for - anxiety or depression  · Ophthalmic ROS: negative for - eye pain or loss of vision  · ENT ROS: negative for - epistaxis, headaches, nasal discharge, sore throat   · Allergy and Immunology ROS: negative for - hives, nasal congestion   · Hematological and Lymphatic ROS: negative for - bleeding problems, blood clots, bruising or jaundice  · Endocrine ROS: negative for - skin changes, temperature intolerance or unexpected weight changes  · Respiratory ROS: negative for - cough, hemoptysis, pleuritic pain, SOB, sputum changes or wheezing  · Cardiovascular ROS: Per HPI. · Gastrointestinal ROS: negative for - abdominal pain, blood in stools, diarrhea, hematemesis, melena, nausea/vomiting or swallowing difficulty/pain  · Genito-Urinary ROS: negative for - dysuria or incontinence  · Musculoskeletal ROS: negative for - joint swelling or muscle pain  · Neurological ROS: negative for - confusion, dizziness, gait disturbance, headaches, numbness/tingling, seizures, speech problems, tremors, visual changes or weakness  · Dermatological ROS: negative for - rash    Physical Examination:  Vitals:    12/03/20 0949   BP: 138/82   Pulse: 86   Temp: 97.1 °F (36.2 °C)       · Constitutional: Oriented. No distress. · Head: Normocephalic and atraumatic. · Mouth/Throat: Oropharynx is clear and moist.   · Eyes: Conjunctivae normal. EOM are normal.   · Neck: Normal range of motion. Neck supple. No rigidity. No JVD present. · Cardiovascular: Normal rate, regular rhythm, S1&S2 and intact distal pulses. · Pulmonary/Chest: Bilateral respiratory sounds. No wheezes. No rhonchi. · Abdominal: Soft. Bowel sounds present. No distension, No tenderness. · Musculoskeletal: No tenderness. No edema    · Lymphadenopathy: Has no cervical adenopathy. · Neurological: Alert and oriented. Cranial nerve appears intact, No Gross deficit   · Skin: Skin is warm and dry. No rash noted. · Psychiatric: Has a normal mood, affect and behavior     Labs:  Reviewed. ECG: reviewed, , with QRS duration 153 ms. No pathologic Q waves, ventricular pre-excitation, or QT prolongation. Studies:   1. Event monitor:     2. Echo 7/10/20:   Summary   Left ventricular cavity size is normal. There is mild concentric left   ventricular hypertrophy. Overall left ventricular systolic function appears   moderately reduced with an ejection fraction of 35%. Abnormal setpal motion. The apical septum and true apex are severely hypo- to akinetic. The apical   lateral wall is severely hypo- to akinetic. The anterolateral wall is mildly   hypokinetic. The anterior wall appears hypokinetic. Mild mitral regurgitation is present. Mild tricuspid regurgitation. Estimated pulmonary artery systolic pressure is at 27 mmHg assuming a right   atrial pressure of 3 mmHg. 3. Stress Test:      4. Cath 7/30/20 (Dr. Renaye Aase): Conclusions:  -No obstructive CAD  -LV dysfunction with LVEF ~40% or so. Distal anterior/apical wall motion abnormality    The MCOT, echocardiogram, stress test, and coronary angiography/PCI were reviewed by myself and used for my plan of care. Procedures:  1. None    Assessment/Plan:  1.  SSS, s/p single chamber PPM 3/30/17  2. NICMP, possibly PPM induced?, EF 35%, on ARB and BB  3. HTN, stable on losartan, Toprol, chlorthalidone  4.   HLD, stable on statin    Her underlying rhythm is complete heart block  PPM is functioning properly with high  of 100%  Cannot make any changes in her device today to minimize  as her underlying rhythm is CHB. She is symptomatic with intermittent s/s of HF including SOB and edema  As she is currently self pay, will save any costly treatments until insurance has been established. Continue losartan 50 mg daily, Toprol 50 mg daily, spironolactone 12.5 mg daily, and chlorthalidone 12.5 mg daily    She is currently on optimal medical therapy, so we need to repeat limited echo. If she continues to have low LV EF, then we discussed about upgrading the device to CRT P / D based on her LVEF. Follow up based on treatment     The CIED was interrogated and programmed and I supervised and reviewed all the data. All findings and changes are in device interrogation sheat and reflect my personal interpretation and changes and is scanned to Epic. - The patient is counseled to follow a low salt diet to assure blood pressure remains controlled for cardiovascular risk factor modification.   - The patient is counseled to avoid excess caffeine, and energy drinks as this may exacerbated ectopy and arrhythmia. - The patient is counseled to get regular exercise 3-5 times per week to control cardiovascular risk factors. - The patient is counseled to lose weigt to control cardiovascular risk factors. -The patient is counseled about the health hazards of smoking including cardiovascular side effects and its impact on morbidity and mortality. Follow up in 3 months with me    Thank you for allowing me to participate in the care of [facility]. All questions and concerns were addressed to the patient/family. Alternatives to my treatment were discussed. Kraig Rao RN, am scribing for and in the presence of Dr. Arvind Pabon.  12/03/20 10:39 AM  DL Mart MD  Cardiac Electrophysiology  [address]

## 2020-12-03 ENCOUNTER — NURSE ONLY (OUTPATIENT)
Dept: CARDIOLOGY CLINIC | Age: 70
End: 2020-12-03

## 2020-12-03 ENCOUNTER — OFFICE VISIT (OUTPATIENT)
Dept: CARDIOLOGY CLINIC | Age: 70
End: 2020-12-03

## 2020-12-03 VITALS
TEMPERATURE: 97.1 F | SYSTOLIC BLOOD PRESSURE: 138 MMHG | HEART RATE: 86 BPM | BODY MASS INDEX: 41.15 KG/M2 | WEIGHT: 225 LBS | DIASTOLIC BLOOD PRESSURE: 82 MMHG

## 2020-12-03 PROCEDURE — 99213 OFFICE O/P EST LOW 20 MIN: CPT | Performed by: INTERNAL MEDICINE

## 2020-12-03 PROCEDURE — 93279 PRGRMG DEV EVAL PM/LDLS PM: CPT | Performed by: INTERNAL MEDICINE

## 2020-12-03 RX ORDER — CHLORTHALIDONE 25 MG/1
12.5 TABLET ORAL DAILY
Qty: 45 TABLET | Refills: 5 | Status: SHIPPED | OUTPATIENT
Start: 2020-12-03 | End: 2021-03-10 | Stop reason: SDUPTHER

## 2020-12-03 RX ORDER — LOSARTAN POTASSIUM 50 MG/1
50 TABLET ORAL DAILY
Qty: 90 TABLET | Refills: 3 | Status: SHIPPED | OUTPATIENT
Start: 2020-12-03 | End: 2021-12-21

## 2020-12-03 RX ORDER — METOPROLOL SUCCINATE 50 MG/1
50 TABLET, EXTENDED RELEASE ORAL DAILY
Qty: 90 TABLET | Refills: 3 | Status: SHIPPED | OUTPATIENT
Start: 2020-12-03 | End: 2021-03-10 | Stop reason: SDUPTHER

## 2020-12-03 RX ORDER — SIMVASTATIN 40 MG
40 TABLET ORAL NIGHTLY
Qty: 90 TABLET | Refills: 3 | Status: SHIPPED | OUTPATIENT
Start: 2020-12-03 | End: 2021-03-10 | Stop reason: SDUPTHER

## 2020-12-03 RX ORDER — SPIRONOLACTONE 25 MG/1
12.5 TABLET ORAL DAILY
Qty: 45 TABLET | Refills: 3 | Status: SHIPPED | OUTPATIENT
Start: 2020-12-03 | End: 2021-03-10 | Stop reason: SDUPTHER

## 2020-12-03 NOTE — PROGRESS NOTES
Patient comes into the office today for a 3 month device check and ov w/UL. Interrogation of [employer] [place] single chamber pacemaker shows normal device function. All sensing and pacing parameters within normal range. 4.5 years to [facility]  Presenting  @ 61 bpm  Underlying Vs @ 30 bpm   99.9%  0 VHR detected. No changes made during this session. See Paceart report under the Cardiology tab. Patient currently takes metoprolol, ASA 81 mg    Patient will follow up in 3 months in office and/or remotely.

## 2020-12-16 ENCOUNTER — OFFICE VISIT (OUTPATIENT)
Dept: CARDIOLOGY CLINIC | Age: 70
End: 2020-12-16

## 2020-12-16 VITALS
DIASTOLIC BLOOD PRESSURE: 70 MMHG | BODY MASS INDEX: 37.05 KG/M2 | HEART RATE: 78 BPM | WEIGHT: 222.4 LBS | TEMPERATURE: 97.3 F | SYSTOLIC BLOOD PRESSURE: 100 MMHG | HEIGHT: 65 IN | OXYGEN SATURATION: 97 %

## 2020-12-16 PROCEDURE — 93000 ELECTROCARDIOGRAM COMPLETE: CPT | Performed by: INTERNAL MEDICINE

## 2020-12-16 PROCEDURE — 99214 OFFICE O/P EST MOD 30 MIN: CPT | Performed by: INTERNAL MEDICINE

## 2020-12-16 ASSESSMENT — ENCOUNTER SYMPTOMS
ABDOMINAL DISTENTION: 0
COUGH: 0
BACK PAIN: 0
BLOOD IN STOOL: 0
EYE DISCHARGE: 0
SHORTNESS OF BREATH: 0
VOMITING: 0
WHEEZING: 0
COLOR CHANGE: 0
CHEST TIGHTNESS: 0
FACIAL SWELLING: 0
ABDOMINAL PAIN: 0

## 2020-12-16 NOTE — ASSESSMENT & PLAN NOTE
Possibly pacemaker related. Unremarkable cath. Continue current medications. We will need to reassess EF in the next few months.

## 2020-12-16 NOTE — PROGRESS NOTES
is not in acute distress. Appearance: She is well-developed. She is not diaphoretic. HENT:      Head: Normocephalic and atraumatic. Eyes:      Pupils: Pupils are equal, round, and reactive to light. Neck:      Musculoskeletal: Normal range of motion. Thyroid: No thyromegaly. Vascular: No JVD. Cardiovascular:      Rate and Rhythm: Normal rate and regular rhythm. Chest Wall: PMI is not displaced. Heart sounds: Normal heart sounds, S1 normal and S2 normal. No murmur. No friction rub. No gallop. Pulmonary:      Effort: Pulmonary effort is normal. No respiratory distress. Breath sounds: Normal breath sounds. No stridor. No wheezing or rales. Chest:      Chest wall: No tenderness. Abdominal:      General: Bowel sounds are normal. There is no distension. Palpations: Abdomen is soft. Tenderness: There is no abdominal tenderness. There is no guarding or rebound. Musculoskeletal: Normal range of motion. General: No tenderness. Lymphadenopathy:      Cervical: No cervical adenopathy. Skin:     General: Skin is warm and dry. Findings: No erythema or rash. Neurological:      Mental Status: She is alert and oriented to person, place, and time. Coordination: Coordination normal.   Psychiatric:         Behavior: Behavior normal.         Thought Content:  Thought content normal.         Judgment: Judgment normal.         Labs:       Lab Results   Component Value Date    WBC 4.7 07/30/2020    HGB 14.5 07/30/2020    HCT 40.8 07/30/2020    MCV 96.7 07/30/2020     07/30/2020     Lab Results   Component Value Date     08/06/2020    K 4.0 08/06/2020     08/06/2020    CO2 27 08/06/2020    BUN 14 08/06/2020    CREATININE 1.1 08/06/2020    GLUCOSE 112 (H) 08/06/2020    CALCIUM 10.3 08/06/2020    PROT 8.0 07/30/2020    LABALBU 5.0 07/30/2020    BILITOT 2.8 (H) 07/30/2020    ALKPHOS 72 07/30/2020    AST 20 07/30/2020    ALT 13 07/30/2020    LABGLOM 49 (A) 08/06/2020    GFRAA 60 (A) 08/06/2020    AGRATIO 1.7 07/30/2020    GLOB 3.0 07/30/2020         No results found for: CHOL  No results found for: TRIG  No results found for: HDL  No results found for: LDLCHOLESTEROL, LDLCALC  No results found for: LABVLDL, VLDL  No results found for: [facility]    Lab Results   Component Value Date    INR 1.02 07/30/2020    PROTIME 11.8 07/30/2020       The ASCVD Risk score (Tracey Hernandez, et al., 2013) failed to calculate for the following reasons:    Cannot find a previous HDL lab    Cannot find a previous total cholesterol lab    Unable to determine if patient is Non-       Imaging:       Last ECG (if available):  Paced    Last Monitor/Holter    Last Stress (if available):    Last Cath (if available): 7/30/20  Angiographic Findings:  Co dominant system  Left Main:  Normal     Left Anterior Descending:  Mild irregular plaque     Circumflex:  Mild irregular plaque     Right Coronary:  Mild irregular plaque     Left Ventriculogram:  LVEF ~40%. Distal anterior wall/apical dysfunction     Hemodynamics (mm Hg):  Left Ventricular Pressure:  143/2, 3  Central Aortic Pressure:  147/56 (87)     Conclusions:  -No obstructive CAD  -LV dysfunction with LVEF ~40% or so. Distal anterior/apical wall motion abnormality    Last TTE/JANIE(if available): 7/10/20   Summary   Left ventricular cavity size is normal. There is mild concentric left   ventricular hypertrophy. Overall left ventricular systolic function appears   moderately reduced with an ejection fraction of 35%. Abnormal setpal motion. The apical septum and true apex are severely hypo- to akinetic. The apical   lateral wall is severely hypo- to akinetic. The anterolateral wall is mildly   hypokinetic. The anterior wall appears hypokinetic. Mild mitral regurgitation is present. Mild tricuspid regurgitation.    Estimated pulmonary artery systolic pressure is at 27 mmHg assuming a right   atrial pressure of 3

## 2020-12-16 NOTE — ASSESSMENT & PLAN NOTE
Pacemaker working properly  Underlying rhythm is complete heart block. Ventricularly paced 100%. Will likely benefit from upgrade although insurance is an issue at this point in time.

## 2021-01-11 ENCOUNTER — TELEPHONE (OUTPATIENT)
Dept: CARDIOLOGY CLINIC | Age: 71
End: 2021-01-11

## 2021-01-11 DIAGNOSIS — I51.9 LV DYSFUNCTION: Primary | ICD-10-CM

## 2021-01-11 NOTE — TELEPHONE ENCOUNTER
Nicolas Parnell the patient's son called stating that the patient has insurance and is ready to schedule a pacemaker upgrade procedure. Please call Nicolas Parnell back at [phone].

## 2021-01-12 NOTE — TELEPHONE ENCOUNTER
Spoke with patient's song. Patient needs a limited echo before scheduling ppm upgrade. Echo set up for 1/22/21 at [facility] office.  After echo then Dr. Natalie Clayton can decide which device is best.

## 2021-01-22 ENCOUNTER — PROCEDURE VISIT (OUTPATIENT)
Dept: CARDIOLOGY CLINIC | Age: 71
End: 2021-01-22
Payer: COMMERCIAL

## 2021-01-22 ENCOUNTER — TELEPHONE (OUTPATIENT)
Dept: CARDIOLOGY CLINIC | Age: 71
End: 2021-01-22

## 2021-01-22 DIAGNOSIS — I51.9 LV DYSFUNCTION: ICD-10-CM

## 2021-01-22 PROCEDURE — 93308 TTE F-UP OR LMTD: CPT | Performed by: INTERNAL MEDICINE

## 2021-01-22 NOTE — TELEPHONE ENCOUNTER
Was speaking with pt regarding results. Pt was told to call back when she had insurance to follow up with [facility]. Looks like he doesn't have anything for months. He said in his note he wanted to follow up with him. Can you get pt in anywhere? Needs an upgrade I believe. Arvind Vinson

## 2021-01-25 ENCOUNTER — PREP FOR PROCEDURE (OUTPATIENT)
Dept: CARDIOLOGY CLINIC | Age: 71
End: 2021-01-25

## 2021-01-25 RX ORDER — CEFAZOLIN SODIUM 1 G/3ML
2 INJECTION, POWDER, FOR SOLUTION INTRAMUSCULAR; INTRAVENOUS ONCE
Status: CANCELLED | OUTPATIENT
Start: 2021-02-24

## 2021-01-25 RX ORDER — SODIUM CHLORIDE 9 MG/ML
INJECTION, SOLUTION INTRAVENOUS CONTINUOUS
Status: CANCELLED | OUTPATIENT
Start: 2021-01-25

## 2021-01-25 RX ORDER — SODIUM CHLORIDE 0.9 % (FLUSH) 0.9 %
10 SYRINGE (ML) INJECTION EVERY 12 HOURS SCHEDULED
Status: CANCELLED | OUTPATIENT
Start: 2021-01-25

## 2021-01-25 RX ORDER — SODIUM CHLORIDE 0.9 % (FLUSH) 0.9 %
10 SYRINGE (ML) INJECTION PRN
Status: CANCELLED | OUTPATIENT
Start: 2021-01-25

## 2021-01-25 NOTE — TELEPHONE ENCOUNTER
UL, pt now has insurance and is ready to schedule upgrade. Limited echo (1/22/21) showed EF 40-45%.   Can we schedule for upgrade to CRT-P?

## 2021-01-25 NOTE — TELEPHONE ENCOUNTER
Generator Change for Permanent Pacemaker/Implantable Cardioverter Defibrillator      Date of Procedure:  2/24/21    Time of Arrival: 9:30    Cardiologist performing procedure: Dr. Beryle Barrier      · Arrive at [facility]. through the main entrance. Check in at the Outpatient Diagnostic desk on the 1st floor. · Do not eat or drink anything after midnight the night before the procedure. · You may brush your teeth and rinse the morning of the procedure. · Take your routine medications the morning of the procedure. However, if you are taking diabetic medications, please HOLD on the day of the procedure (including insulin). If you take Lantus insulin, take HALF of your usual dose the night before. · Do NOT stop taking aspirin or any blood thinners. However, do not take any blood thinners the morning of the procedure. · Get tested for COVID on 2/18/21 at the [facility]. testing center (located outside the hospital). They are open 8 am - 3 pm.    · Please use Hibiclens soap (or any antibacterial soap such as Dial) to wash neck, chest, and abdomen the night before the procedure. · Do not apply any lotion, powder, or deodorant after you shower the night before or the morning of the procedure. · Please bring a list of your medications to the hospital with you. · You must have someone available to drive you home that same day. It is recommended that you do not drive for 24 hours after the procedure. You will also need someone with you at home the night of the procedure. · If you are unable to make this appointment, please call [facility] at [phone].   ·

## 2021-02-18 ENCOUNTER — TELEPHONE (OUTPATIENT)
Dept: CARDIOLOGY CLINIC | Age: 71
End: 2021-02-18

## 2021-02-18 ENCOUNTER — OFFICE VISIT (OUTPATIENT)
Dept: PRIMARY CARE CLINIC | Age: 71
End: 2021-02-18
Payer: COMMERCIAL

## 2021-02-18 DIAGNOSIS — Z01.818 PREOP EXAMINATION: Primary | ICD-10-CM

## 2021-02-18 PROCEDURE — 99211 OFF/OP EST MAY X REQ PHY/QHP: CPT | Performed by: NURSE PRACTITIONER

## 2021-02-18 NOTE — TELEPHONE ENCOUNTER
Spoke with pt and confirmed upcoming Generator Change on 02/24/2021, pt arriving at [facility] at 09:30am.  Reviewed all preop instructions previously given to pt. COVID testing, CTA, and labs to be completed Today. Pt verbalized understanding.

## 2021-02-19 LAB — SARS-COV-2: NOT DETECTED

## 2021-02-24 ENCOUNTER — ANESTHESIA EVENT (OUTPATIENT)
Dept: CARDIAC CATH/INVASIVE PROCEDURES | Age: 71
End: 2021-02-24

## 2021-02-24 ENCOUNTER — HOSPITAL ENCOUNTER (OUTPATIENT)
Dept: CARDIAC CATH/INVASIVE PROCEDURES | Age: 71
Setting detail: OBSERVATION
Discharge: HOME | End: 2021-02-25
Attending: INTERNAL MEDICINE | Admitting: INTERNAL MEDICINE
Payer: COMMERCIAL

## 2021-02-24 ENCOUNTER — ANESTHESIA (OUTPATIENT)
Dept: CARDIAC CATH/INVASIVE PROCEDURES | Age: 71
End: 2021-02-24

## 2021-02-24 VITALS — OXYGEN SATURATION: 99 % | DIASTOLIC BLOOD PRESSURE: 68 MMHG | SYSTOLIC BLOOD PRESSURE: 110 MMHG

## 2021-02-24 DIAGNOSIS — Z95.0 CARDIAC RESYNCHRONIZATION THERAPY PACEMAKER (CRT-P) IN PLACE: Primary | ICD-10-CM

## 2021-02-24 DIAGNOSIS — I42.8 NICM (NONISCHEMIC CARDIOMYOPATHY) (HCC): ICD-10-CM

## 2021-02-24 LAB
ANION GAP SERPL CALCULATED.3IONS-SCNC: 11 MMOL/L (ref 3–16)
BUN BLDV-MCNC: 22 MG/DL (ref 7–20)
C-REACTIVE PROTEIN: <3 MG/L (ref 0–5.1)
CALCIUM SERPL-MCNC: 10.5 MG/DL (ref 8.3–10.6)
CHLORIDE BLD-SCNC: 103 MMOL/L (ref 99–110)
CO2: 28 MMOL/L (ref 21–32)
CREAT SERPL-MCNC: 1.2 MG/DL (ref 0.6–1.2)
EKG ATRIAL RATE: 88 BPM
EKG DIAGNOSIS: NORMAL
EKG Q-T INTERVAL: 410 MS
EKG QRS DURATION: 158 MS
EKG QTC CALCULATION (BAZETT): 493 MS
EKG R AXIS: 67 DEGREES
EKG T AXIS: 64 DEGREES
EKG VENTRICULAR RATE: 87 BPM
GFR AFRICAN AMERICAN: 54
GFR NON-AFRICAN AMERICAN: 44
GLUCOSE BLD-MCNC: 136 MG/DL (ref 70–99)
HCT VFR BLD CALC: 39.9 % (ref 36–48)
HEMOGLOBIN: 13.7 G/DL (ref 12–16)
INR BLD: 0.99 (ref 0.86–1.14)
MCH RBC QN AUTO: 33.9 PG (ref 26–34)
MCHC RBC AUTO-ENTMCNC: 34.4 G/DL (ref 31–36)
MCV RBC AUTO: 98.7 FL (ref 80–100)
PDW BLD-RTO: 13.1 % (ref 12.4–15.4)
PLATELET # BLD: 137 K/UL (ref 135–450)
PMV BLD AUTO: 8.2 FL (ref 5–10.5)
POTASSIUM SERPL-SCNC: 3.8 MMOL/L (ref 3.5–5.1)
PROCALCITONIN: 0.07 NG/ML (ref 0–0.15)
PROTHROMBIN TIME: 11.5 SEC (ref 10–13.2)
RBC # BLD: 4.04 M/UL (ref 4–5.2)
SODIUM BLD-SCNC: 142 MMOL/L (ref 136–145)
WBC # BLD: 4.5 K/UL (ref 4–11)

## 2021-02-24 PROCEDURE — 6360000002 HC RX W HCPCS: Performed by: INTERNAL MEDICINE

## 2021-02-24 PROCEDURE — G0379 DIRECT REFER HOSPITAL OBSERV: HCPCS

## 2021-02-24 PROCEDURE — C1887 CATHETER, GUIDING: HCPCS

## 2021-02-24 PROCEDURE — 93010 ELECTROCARDIOGRAM REPORT: CPT | Performed by: INTERNAL MEDICINE

## 2021-02-24 PROCEDURE — C1769 GUIDE WIRE: HCPCS

## 2021-02-24 PROCEDURE — 80048 BASIC METABOLIC PNL TOTAL CA: CPT

## 2021-02-24 PROCEDURE — 6370000000 HC RX 637 (ALT 250 FOR IP): Performed by: INTERNAL MEDICINE

## 2021-02-24 PROCEDURE — 2500000003 HC RX 250 WO HCPCS

## 2021-02-24 PROCEDURE — 93005 ELECTROCARDIOGRAM TRACING: CPT | Performed by: INTERNAL MEDICINE

## 2021-02-24 PROCEDURE — 6360000002 HC RX W HCPCS: Performed by: NURSE ANESTHETIST, CERTIFIED REGISTERED

## 2021-02-24 PROCEDURE — 85027 COMPLETE CBC AUTOMATED: CPT

## 2021-02-24 PROCEDURE — 33225 L VENTRIC PACING LEAD ADD-ON: CPT | Performed by: INTERNAL MEDICINE

## 2021-02-24 PROCEDURE — 6360000002 HC RX W HCPCS

## 2021-02-24 PROCEDURE — G0378 HOSPITAL OBSERVATION PER HR: HCPCS

## 2021-02-24 PROCEDURE — 33225 L VENTRIC PACING LEAD ADD-ON: CPT

## 2021-02-24 PROCEDURE — 2580000003 HC RX 258: Performed by: NURSE ANESTHETIST, CERTIFIED REGISTERED

## 2021-02-24 PROCEDURE — C1894 INTRO/SHEATH, NON-LASER: HCPCS

## 2021-02-24 PROCEDURE — 86140 C-REACTIVE PROTEIN: CPT

## 2021-02-24 PROCEDURE — 3700000001 HC ADD 15 MINUTES (ANESTHESIA)

## 2021-02-24 PROCEDURE — C2621 PMKR, OTHER THAN SING/DUAL: HCPCS

## 2021-02-24 PROCEDURE — 84145 PROCALCITONIN (PCT): CPT

## 2021-02-24 PROCEDURE — C1900 LEAD, CORONARY VENOUS: HCPCS

## 2021-02-24 PROCEDURE — 33233 REMOVAL OF PM GENERATOR: CPT | Performed by: INTERNAL MEDICINE

## 2021-02-24 PROCEDURE — 33206 INSERT HEART PM ATRIAL: CPT | Performed by: INTERNAL MEDICINE

## 2021-02-24 PROCEDURE — 3700000000 HC ANESTHESIA ATTENDED CARE

## 2021-02-24 PROCEDURE — 85610 PROTHROMBIN TIME: CPT

## 2021-02-24 PROCEDURE — 33214 UPGRADE OF PACEMAKER SYSTEM: CPT

## 2021-02-24 PROCEDURE — 2580000003 HC RX 258: Performed by: INTERNAL MEDICINE

## 2021-02-24 PROCEDURE — C1898 LEAD, PMKR, OTHER THAN TRANS: HCPCS

## 2021-02-24 RX ORDER — CEFAZOLIN SODIUM 2 G/50ML
2000 SOLUTION INTRAVENOUS ONCE
Status: COMPLETED | OUTPATIENT
Start: 2021-02-24 | End: 2021-02-24

## 2021-02-24 RX ORDER — SPIRONOLACTONE 25 MG/1
12.5 TABLET ORAL DAILY
Status: DISCONTINUED | OUTPATIENT
Start: 2021-02-24 | End: 2021-02-25 | Stop reason: HOSPADM

## 2021-02-24 RX ORDER — SODIUM CHLORIDE 0.9 % (FLUSH) 0.9 %
10 SYRINGE (ML) INJECTION PRN
Status: DISCONTINUED | OUTPATIENT
Start: 2021-02-24 | End: 2021-02-24

## 2021-02-24 RX ORDER — SODIUM CHLORIDE 0.9 % (FLUSH) 0.9 %
10 SYRINGE (ML) INJECTION EVERY 12 HOURS SCHEDULED
Status: DISCONTINUED | OUTPATIENT
Start: 2021-02-24 | End: 2021-02-24

## 2021-02-24 RX ORDER — OXYCODONE HYDROCHLORIDE 5 MG/1
5 TABLET ORAL EVERY 6 HOURS PRN
Status: DISCONTINUED | OUTPATIENT
Start: 2021-02-24 | End: 2021-02-25 | Stop reason: HOSPADM

## 2021-02-24 RX ORDER — LOSARTAN POTASSIUM 50 MG/1
50 TABLET ORAL DAILY
Status: DISCONTINUED | OUTPATIENT
Start: 2021-02-25 | End: 2021-02-25 | Stop reason: HOSPADM

## 2021-02-24 RX ORDER — SODIUM CHLORIDE 0.9 % (FLUSH) 0.9 %
10 SYRINGE (ML) INJECTION PRN
Status: DISCONTINUED | OUTPATIENT
Start: 2021-02-24 | End: 2021-02-25 | Stop reason: HOSPADM

## 2021-02-24 RX ORDER — METOPROLOL SUCCINATE 50 MG/1
50 TABLET, EXTENDED RELEASE ORAL DAILY
Status: DISCONTINUED | OUTPATIENT
Start: 2021-02-24 | End: 2021-02-25 | Stop reason: HOSPADM

## 2021-02-24 RX ORDER — CEFAZOLIN SODIUM 2 G/50ML
2000 SOLUTION INTRAVENOUS EVERY 8 HOURS
Status: COMPLETED | OUTPATIENT
Start: 2021-02-24 | End: 2021-02-25

## 2021-02-24 RX ORDER — SODIUM CHLORIDE 0.9 % (FLUSH) 0.9 %
10 SYRINGE (ML) INJECTION EVERY 12 HOURS SCHEDULED
Status: DISCONTINUED | OUTPATIENT
Start: 2021-02-24 | End: 2021-02-25 | Stop reason: HOSPADM

## 2021-02-24 RX ORDER — ASPIRIN 81 MG/1
81 TABLET ORAL DAILY
Status: DISCONTINUED | OUTPATIENT
Start: 2021-02-24 | End: 2021-02-25 | Stop reason: HOSPADM

## 2021-02-24 RX ORDER — SODIUM CHLORIDE 9 MG/ML
INJECTION, SOLUTION INTRAVENOUS CONTINUOUS
Status: DISCONTINUED | OUTPATIENT
Start: 2021-02-24 | End: 2021-02-24

## 2021-02-24 RX ORDER — SODIUM CHLORIDE, SODIUM LACTATE, POTASSIUM CHLORIDE, CALCIUM CHLORIDE 600; 310; 30; 20 MG/100ML; MG/100ML; MG/100ML; MG/100ML
INJECTION, SOLUTION INTRAVENOUS CONTINUOUS PRN
Status: DISCONTINUED | OUTPATIENT
Start: 2021-02-24 | End: 2021-02-24 | Stop reason: SDUPTHER

## 2021-02-24 RX ORDER — CEFAZOLIN SODIUM 1 G/3ML
2 INJECTION, POWDER, FOR SOLUTION INTRAMUSCULAR; INTRAVENOUS ONCE
Status: DISCONTINUED | OUTPATIENT
Start: 2021-02-24 | End: 2021-02-24

## 2021-02-24 RX ORDER — CHLORTHALIDONE 25 MG/1
12.5 TABLET ORAL DAILY
Status: DISCONTINUED | OUTPATIENT
Start: 2021-02-25 | End: 2021-02-25 | Stop reason: HOSPADM

## 2021-02-24 RX ORDER — ATORVASTATIN CALCIUM 10 MG/1
10 TABLET, FILM COATED ORAL DAILY
Status: DISCONTINUED | OUTPATIENT
Start: 2021-02-24 | End: 2021-02-25 | Stop reason: HOSPADM

## 2021-02-24 RX ORDER — FENTANYL CITRATE 50 UG/ML
INJECTION, SOLUTION INTRAMUSCULAR; INTRAVENOUS PRN
Status: DISCONTINUED | OUTPATIENT
Start: 2021-02-24 | End: 2021-02-24 | Stop reason: SDUPTHER

## 2021-02-24 RX ORDER — PROPOFOL 10 MG/ML
INJECTION, EMULSION INTRAVENOUS CONTINUOUS PRN
Status: DISCONTINUED | OUTPATIENT
Start: 2021-02-24 | End: 2021-02-24 | Stop reason: SDUPTHER

## 2021-02-24 RX ADMIN — PHENYLEPHRINE HYDROCHLORIDE 80 MCG: 10 INJECTION, SOLUTION INTRAMUSCULAR; INTRAVENOUS; SUBCUTANEOUS at 13:06

## 2021-02-24 RX ADMIN — CEFAZOLIN SODIUM 2 G: 2 SOLUTION INTRAVENOUS at 12:53

## 2021-02-24 RX ADMIN — FENTANYL CITRATE 50 MCG: 50 INJECTION, SOLUTION INTRAMUSCULAR; INTRAVENOUS at 12:39

## 2021-02-24 RX ADMIN — FENTANYL CITRATE 25 MCG: 50 INJECTION, SOLUTION INTRAMUSCULAR; INTRAVENOUS at 13:05

## 2021-02-24 RX ADMIN — CEFAZOLIN SODIUM 2000 MG: 2 SOLUTION INTRAVENOUS at 23:36

## 2021-02-24 RX ADMIN — OXYCODONE 5 MG: 5 TABLET ORAL at 17:25

## 2021-02-24 RX ADMIN — SODIUM CHLORIDE, SODIUM LACTATE, POTASSIUM CHLORIDE, AND CALCIUM CHLORIDE: .6; .31; .03; .02 INJECTION, SOLUTION INTRAVENOUS at 11:25

## 2021-02-24 RX ADMIN — SODIUM CHLORIDE, SODIUM LACTATE, POTASSIUM CHLORIDE, AND CALCIUM CHLORIDE: .6; .31; .03; .02 INJECTION, SOLUTION INTRAVENOUS at 15:01

## 2021-02-24 RX ADMIN — Medication 10 ML: at 23:36

## 2021-02-24 RX ADMIN — FENTANYL CITRATE 50 MCG: 50 INJECTION, SOLUTION INTRAMUSCULAR; INTRAVENOUS at 12:44

## 2021-02-24 RX ADMIN — FENTANYL CITRATE 25 MCG: 50 INJECTION, SOLUTION INTRAMUSCULAR; INTRAVENOUS at 12:54

## 2021-02-24 RX ADMIN — PROPOFOL 100 MCG/KG/MIN: 10 INJECTION, EMULSION INTRAVENOUS at 12:40

## 2021-02-24 RX ADMIN — PHENYLEPHRINE HYDROCHLORIDE 100 MCG: 10 INJECTION, SOLUTION INTRAMUSCULAR; INTRAVENOUS; SUBCUTANEOUS at 13:21

## 2021-02-24 RX ADMIN — FENTANYL CITRATE 50 MCG: 50 INJECTION, SOLUTION INTRAMUSCULAR; INTRAVENOUS at 13:45

## 2021-02-24 ASSESSMENT — PULMONARY FUNCTION TESTS
PIF_VALUE: 1
PIF_VALUE: 2
PIF_VALUE: 1
PIF_VALUE: 0
PIF_VALUE: 1
PIF_VALUE: 1

## 2021-02-24 ASSESSMENT — PAIN DESCRIPTION - ORIENTATION: ORIENTATION: LEFT;RIGHT

## 2021-02-24 ASSESSMENT — PAIN DESCRIPTION - DESCRIPTORS: DESCRIPTORS: ACHING

## 2021-02-24 ASSESSMENT — PAIN DESCRIPTION - PAIN TYPE: TYPE: ACUTE PAIN

## 2021-02-24 ASSESSMENT — PAIN SCALES - GENERAL: PAINLEVEL_OUTOF10: 10

## 2021-02-24 ASSESSMENT — PAIN DESCRIPTION - FREQUENCY: FREQUENCY: INTERMITTENT

## 2021-02-24 NOTE — PRE SEDATION
Sedation Pre-Procedure Note    Patient Name: Darling Flood   YOB: 1950  Room/Bed: Room/bed info not found  Medical Record Number: [id number]  Date: 2/24/2021   Time: 12:24 PM       Indication: Nonischemic cardiomyopathy    Consent: I have discussed with the patient and/or the patient representative the indication, alternatives, and the possible risks and/or complications of the planned procedure and the anesthesia methods. The patient and/or patient representative appear to understand and agree to proceed. Vital Signs:   Vitals:    02/24/21 1015   Temp: 99.4 °F (37.4 °C)       Past Medical History:   has a past medical history of Hyperlipidemia and Hypertension. Past Surgical History:   has a past surgical history that includes pacemaker placement. Medications:   Scheduled Meds:    sodium chloride flush  10 mL Intravenous 2 times per day    ceFAZolin  2,000 mg Intravenous Once     Continuous Infusions:    sodium chloride       PRN Meds: sodium chloride flush  Home Meds:   Prior to Admission medications    Medication Sig Start Date End Date Taking?  Authorizing Provider   losartan (COZAAR) 50 MG tablet Take 1 tablet by mouth daily 12/3/20  Yes Shanelle Parker MD   spironolactone (ALDACTONE) 25 MG tablet Take 0.5 tablets by mouth daily 12/3/20  Yes Shanelle Parker MD   simvastatin (ZOCOR) 40 MG tablet Take 1 tablet by mouth nightly 12/3/20  Yes Shanelle Parker MD   metoprolol succinate (TOPROL XL) 50 MG extended release tablet Take 1 tablet by mouth daily 12/3/20  Yes Shanelle Parker MD   chlorthalidone (HYGROTON) 25 MG tablet Take 0.5 tablets by mouth daily 12/3/20  Yes Shanelle Parker MD   aspirin EC 81 MG EC tablet Take 1 tablet by mouth daily 7/14/20  Yes Henry Morfin MD     Coumadin Use Last 7 Days:  no  Antiplatelet drug therapy use last 7 days: yes -aspirin  Other anticoagulant use last 7 days: no  Additional Medication Information: None      Pre-Sedation Documentation and Exam:   I have personally completed a history, physical exam & review of systems for this patient (see notes). Vital signs have been reviewed (see flow sheet for vitals).     Mallampati Airway Assessment:  Mallampati Class I - (soft palate, fauces, uvula & anterior/posterior tonsillar pillars are visible)    Prior History of Anesthesia Complications:   none    ASA Classification:  Class 2 - A normal healthy patient with mild systemic disease    Sedation/ Anesthesia Plan:   general    Medications Planned:   As per the anesthesia team    Patient is an appropriate candidate for plan of sedation: yes    Electronically signed by Stacia Grey MD on 2/24/2021 at 12:24 PM

## 2021-02-24 NOTE — ANESTHESIA PRE PROCEDURE
Department of Anesthesiology  Preprocedure Note       Name:  Babs Seay   Age:  72 y.o.  :  1950                                          MRN:  [id number]         Date:  2021      Surgeon: * Surgery not found *    Procedure:     Medications prior to admission:   Prior to Admission medications    Medication Sig Start Date End Date Taking?  Authorizing Provider   losartan (COZAAR) 50 MG tablet Take 1 tablet by mouth daily 12/3/20  Yes Dmitriy Palencia MD   spironolactone (ALDACTONE) 25 MG tablet Take 0.5 tablets by mouth daily 12/3/20  Yes Dmitriy Palencia MD   simvastatin (ZOCOR) 40 MG tablet Take 1 tablet by mouth nightly 12/3/20  Yes Dmitriy Palencia MD   metoprolol succinate (TOPROL XL) 50 MG extended release tablet Take 1 tablet by mouth daily 12/3/20  Yes Dmitriy Palencia MD   chlorthalidone (HYGROTON) 25 MG tablet Take 0.5 tablets by mouth daily 12/3/20  Yes Dmitriy Palencia MD   aspirin EC 81 MG EC tablet Take 1 tablet by mouth daily 20  Yes Pillo Armenta MD       Current medications:    Current Outpatient Medications   Medication Sig Dispense Refill    losartan (COZAAR) 50 MG tablet Take 1 tablet by mouth daily 90 tablet 3    spironolactone (ALDACTONE) 25 MG tablet Take 0.5 tablets by mouth daily 45 tablet 3    simvastatin (ZOCOR) 40 MG tablet Take 1 tablet by mouth nightly 90 tablet 3    metoprolol succinate (TOPROL XL) 50 MG extended release tablet Take 1 tablet by mouth daily 90 tablet 3    chlorthalidone (HYGROTON) 25 MG tablet Take 0.5 tablets by mouth daily 45 tablet 5    aspirin EC 81 MG EC tablet Take 1 tablet by mouth daily 30 tablet 0     Current Facility-Administered Medications   Medication Dose Route Frequency Provider Last Rate Last Admin    0.9 % sodium chloride infusion   Intravenous Continuous Dmitriy Palencia MD        sodium chloride flush 0.9 % injection 10 mL  10 mL Intravenous 2 times per day Dmitriy Palencia MD        sodium chloride flush 0.9 % injection 10 mL  10 mL Intravenous PRN Dmitriy Palencia MD        ceFAZolin (ANCEF) 2000 mg in dextrose 3 % 50 mL IVPB (duplex)  2,000 mg Intravenous Once Dmitriy Palencia MD           Allergies:  No Known Allergies    Problem List:    Patient Active Problem List   Diagnosis Code    Essential hypertension I10    Mixed hyperlipidemia E78.2    SSS (sick sinus syndrome) ([address] I49.5    Leg edema R60.0    medtronic pacemaker Z95.0    LV dysfunction I51.9       Past Medical History:        Diagnosis Date    Hyperlipidemia     Hypertension        Past Surgical History:        Procedure Laterality Date    PACEMAKER PLACEMENT         Social History:    Social History     Tobacco Use    Smoking status: Never Smoker    Smokeless tobacco: Never Used   Substance Use Topics    Alcohol use: Never     Frequency: Never                                Counseling given: Not Answered      Vital Signs (Current):   Vitals:    02/24/21 1015   Temp: 99.4 °F (37.4 °C)   TempSrc: Oral   Weight: 218 lb (98.9 kg)   Height: 5' 6\" (1.676 m)                                              BP Readings from Last 3 Encounters:   12/16/20 100/70   12/03/20 138/82   08/25/20 110/70       NPO Status:                                                                                 BMI:   Wt Readings from Last 3 Encounters:   02/24/21 218 lb (98.9 kg)   12/16/20 222 lb 6.4 oz (100.9 kg)   12/03/20 225 lb (102.1 kg)     Body mass index is 35.19 kg/m².     CBC:   Lab Results   Component Value Date    WBC 4.5 02/24/2021    RBC 4.04 02/24/2021    HGB 13.7 02/24/2021    HCT 39.9 02/24/2021    MCV 98.7 02/24/2021    RDW 13.1 02/24/2021     02/24/2021       CMP:   Lab Results   Component Value Date     08/06/2020    K 4.0 08/06/2020     08/06/2020    CO2 27 08/06/2020    BUN 14 08/06/2020    CREATININE 1.1 08/06/2020    GFRAA 60 08/06/2020    AGRATIO 1.7 07/30/2020    LABGLOM 49 08/06/2020    GLUCOSE 112 08/06/2020    PROT 8.0 07/30/2020    CALCIUM 10.3 08/06/2020    BILITOT 2.8 07/30/2020    ALKPHOS 72 07/30/2020    AST 20 07/30/2020    ALT 13 07/30/2020       POC Tests: No results for input(s): POCGLU, POCNA, POCK, POCCL, POCBUN, POCHEMO, POCHCT in the last 72 hours. Coags:   Lab Results   Component Value Date    PROTIME 11.8 07/30/2020    INR 1.02 07/30/2020       HCG (If Applicable): No results found for: PREGTESTUR, PREGSERUM, HCG, HCGQUANT     ABGs: No results found for: PHART, PO2ART, BCR4QKP, TBE2UKF, BEART, H8NLZMIC     Type & Screen (If Applicable):  No results found for: LABABO, LABRH    Drug/Infectious Status (If Applicable):  No results found for: HIV, HEPCAB    COVID-19 Screening (If Applicable):   Lab Results   Component Value Date    COVID19 Not Detected 02/18/2021         Anesthesia Evaluation  Patient summary reviewed no history of anesthetic complications:   Airway: Mallampati: II  TM distance: >3 FB   Neck ROM: full  Mouth opening: > = 3 FB Dental:          Pulmonary:Negative Pulmonary ROS                              Cardiovascular:    (+) hypertension:, pacemaker (sick sinus syndrome):,                ROS comment:  Summary   Left ventricular cavity size is normal. There is mild concentric left   ventricular hypertrophy. Overall left ventricular systolic function appears   moderately reduced with an ejection fraction of 35%. Abnormal setpal motion. The apical septum and true apex are severely hypo- to akinetic. The apical   lateral wall is severely hypo- to akinetic. The anterolateral wall is mildly   hypokinetic. The anterior wall appears hypokinetic. Mild mitral regurgitation is present. Mild tricuspid regurgitation. Estimated pulmonary artery systolic pressure is at 27 mmHg assuming a right   atrial pressure of 3 mmHg.       Signature  LV dysfunction    ------------------------------------------------------------------ Electronically signed by Kyung Aguayo MD (Interpreting   physician) on 07/13/2020      Neuro/Psych:               GI/Hepatic/Renal:             Endo/Other:                     Abdominal:           Vascular:                                        Anesthesia Plan      general and MAC     ASA 3       Induction: intravenous. Anesthetic plan and risks discussed with patient.                       Milan Jackson MD   2/24/2021

## 2021-02-24 NOTE — PROCEDURES
[facility]     Electrophysiology Procedure Note       Date of Procedure: 2/24/2021  Patient's Name: Hilton Bonilla  YOB: 1950   Medical Record Number: [id number]  Procedure Performed by: Meagan Payne MD      Procedures performed:    · Selective venography of left upper extremity. · Insertion of MRI compatible right atrial lead under fluoroscopy  · Insertion of MRI compatible left ventricular lead under fluoroscopy  · Removal of the previously placed single-chamber, ventricular pacemaker generator  · Implantation of a MRI compatible biventricular pacemaker generator  · Electronic analysis of lead and device. · Anesthesia: General Anesthesia as provided by anesthesia colleagues  · Mallampati airway assessment class: I  · ASA class: 1  · Automated blood loss less than 20 cc      Indication of the procedure:    Hilton Bonilla is a 72 y.o. female with non-ischemic cardiomyopathy, LVEF 35%, NYHA Class 2-3, QRS duration of more than 150 ms, s/p single-chamber pacemaker implantation from [place] [facility], for history of complete heart block, currently, pacemaker dependent, who developed RV pacing cardiomyopathy, on optimal medical therapy with beta-blockers and ARB, for more than 9 months. Secondary to RV pacing cardiomyopathy, she was recommended to have upgrade of her single-chamber pacemaker to CRT P. Details of procedure: The patient was brought to the electrophysiology laboratory in stable condition. The patient was in a fasting and non-sedated state. The risks, benefits and alternatives of the procedure were discussed with the patient. The risks including, but not limited to, the risks of vascular injury, bleeding, infection, device malfunction, lead dislodgement, radiation exposure, injury to cardiac and surrounding structures (including pneumothorax), stroke, myocardial infarction and death were discussed in detail. The patient opted to proceed with the device implantation.  Written informed consent was signed and placed in the chart. Prophylactic antibiotic using Ancef 2mg IV was given. The patient was prepped and draped in sterile fashion. A timeout protocol was completed to identify the patient and the procedure being performed. General Anesthesia as provided by anesthesia colleagues. The patient was monitored continuously with ECG, pulse oximetry, blood pressure monitoring, and direct observation. An incision was made in the left upper pectoral area in a transverse line roughly 1 cm from the clavicle after administration of lidocaine/bupivicaine combination. Using electrocautery and blunt dissection, the previously placed. Central venous access into the left axillary vein was obtained using the modified Seldinger technique. After central venous access was obtained, a sheath was placed in the axillary vein. A new sheath was advanced over the wire into the vein. The atrial lead was advanced to the right atrial appendage and actively fixated under fluoroscopic guidance. After confirming appropriate function and no diaphragmatic stimulation at maximum output, the sheath was split and removed. The lead was secured to the underlying tissue using suture. A new sheath was advanced over a second previously placed wire into the axillary vein. Using the Medtronic [employer] and CSL1 catheter, we cannulated and entered deep into the coronary sinus. Once the guide sheath was parked here, the catheter was removed. We then performed a CS venography using the Medtronic balloon-tipped catheter that occluded the main body of the CS, and, under fluoroscopy, contrast was used to elucidate the branches of the CS. We found lateral branches off the CS, at 3 o'clock position.     Next, using the Medtronic Hybrid stylet we advanced the quadripolar 4 Fr tip (5 Fr body) Medtronic CS lead into the "[employer] sheath and into the visualized CS branch that had a 3 o'clock take-off from the main CS body on an BG projection. The lead was tested, revealing good threshold and sensing (no diaphragmatic stimulation at 10 mV). After confirming appropriate function, the sheath was split and removed. The lead was secured to the underlying tissue using suture material.    Previously placed Medtronic, single-chamber, ventricular pacemaker pulse generator was removed. The leads were then connected to the new BiV pacemaker pulse generator which was then placed into the cleaned pocket. The pulse generator was sutured inside the pocket. Copious amount (> 200 cc) of saline flush was used to irrigate the pocket. Pocket was closed in three layers using running 2-0, then 3-0 and 4-0 bioabsorbable suture. The skin was covered with pressure dressing. All sponge and needle counts were reported as correct at the end of the procedure. The patient tolerated the procedure well and there were no complications. Post-sedation evaluation was completed. Patient was transported to the holding area in stable condition. Device and Leads information:                  Device was programmed to DDD with lower rate of 50 and upper tracking rate of 130. Impression:    Pre- and post-procedural diagnoses of non-ischemic cardiomyopathy, LVEF 35%, NYHA 2, QRS more than 150 ms, RV pacemaker dependency secondary to complete heart block who has been optimized on medical therapy for at least 9 months with successful upgrade of her previously placed single-chamber ventricular pacemaker to a biventricular pacemaker, with new atrial lead and left ventricular lead implantation. Plan:   The patient will be admitted and have usual post-implant care, including chest x-ray, and antibiotic therapy and interrogation of the device. If there are no complications, the patient will be discharged home tomorrow with a 1-week wound check with our clinic nurse and a 3-month follow-up with Dr. Perlita Arreola.     Thank you for allowing us to participate in the care of your patient. If you have any questions, please do not hesitate to contact me.     Emilee Ferrera MD   Cardiac Electrophysiology  [address] [facility] [phone]  [facility]

## 2021-02-24 NOTE — ANESTHESIA POSTPROCEDURE EVALUATION
Department of Anesthesiology  Postprocedure Note    Patient: Darling Flood  MRN: [id number]  YOB: 1950  Date of evaluation: 2/24/2021  Time:  5:29 PM     Procedure Summary     Date: 02/24/21 Room / Location: [facility] Cath Lab    Anesthesia Start: 1232 Anesthesia Stop:     Procedure: CATH LAB WITH ANESTHESIA Diagnosis: NICM (nonischemic cardiomyopathy) ([facility] [address]    Scheduled Providers: SAMMY Tran - JAROCHO; Maxwell Santos MD Responsible Provider: Dariel Steward DO    Anesthesia Type: general, MAC ASA Status: 3          Anesthesia Type: No value filed. Katherine Phase I:      Katherine Phase II:      Last vitals: Reviewed and per EMR flowsheets.        Anesthesia Post Evaluation    Patient location during evaluation: bedside  Patient participation: complete - patient participated  Level of consciousness: awake  Pain score: 2  Airway patency: patent  Nausea & Vomiting: no nausea and no vomiting  Complications: no  Cardiovascular status: blood pressure returned to baseline  Respiratory status: acceptable  Hydration status: euvolemic

## 2021-02-24 NOTE — H&P
[facility]   Cardiac Electrophysiology H&P  Date: 2/24/2021  Admit Date:  2/24/2021  Reason for admission: Nonischemic cardiomyopathy, upgrade to CRT-P  Consult Requesting Physician: No att. providers found     No chief complaint on file. HPI: Marco Antonio Galdamez is a 72 y.o. female referred by Dr. Eliseo Garcia for [address]. PMH significant for HTN, HLD, CHF, SSS s/p MDT single chamber PPM placed in [place] [facility] [place] (3/30/17). Pt moved from [place] in 2/2020 and has established cardiology care in our office. Echo (7/2020) showed EF of 35% and RWMA, s/p LHC showed no obstructive CAD. Currently on losartan, Toprol, and spironolactone. She is pacer dependent. Her LV ejection fraction is 35%. And there is no obstructive coronary artery disease. She is on optimal medical therapy for more than 9 months. Secondary to persistent low LV ejection fraction, possibly due to RV pacing induced cardiomyopathy, she was recommended to have upgrade of her single-chamber ventricular pacemaker to CRT P. Today, she is noted to have temperature of 99.4. Patient denies any symptoms of chest pain, shortness of breath, lightheadedness, fever, dysuria or skin infection anywhere. She denies any history of cough. She is tested for Covid and negative. Her only complaint is about her right knee pain. Right knee is benign. Past Medical History:   Diagnosis Date    Hyperlipidemia     Hypertension         Past Surgical History:   Procedure Laterality Date    PACEMAKER PLACEMENT         No Known Allergies    Social History:  Reviewed. reports that she has never smoked. She has never used smokeless tobacco. She reports that she does not drink alcohol. Family History:  Reviewed. family history is not on file. No premature CAD. Review of System:  All other systems reviewed except for that noted above.  Pertinent negatives and positives are:     Objective      · General: negative for fever, chills   · Ophthalmic ROS: negative for - eye pain or loss of vision  · ENT ROS: negative for - headaches, sore throat   · Respiratory: negative for - cough, sputum  · Cardiovascular: Reviewed in HPI  · Gastrointestinal: negative for - abdominal pain, diarrhea, N/V  · Hematology: negative for - bleeding, blood clots, bruising or jaundice  · Genito-Urinary:  negative for - Dysuria or incontinence  · Musculoskeletal: negative for - Joint swelling, muscle pain  · Neurological: negative for - confusion, dizziness, headaches   · Psychiatric: No anxiety, no depression. · Dermatological: negative for - rash    Physical Examination:  Vitals:    21 1015   Temp: 99.4 °F (37.4 °C)      No intake or output data in the 24 hours ending 21 1218  No intake/output data recorded. Wt Readings from Last 3 Encounters:   21 218 lb (98.9 kg)   20 222 lb 6.4 oz (100.9 kg)   20 225 lb (102.1 kg)     Temp  Av.4 °F (37.4 °C)  Min: 99.4 °F (37.4 °C)  Max: 99.4 °F (37.4 °C)    · Telemetry: Ventricular paced rhythm    · Constitutional: Alert. Oriented to person, place, and time. No distress. · Head: Normocephalic and atraumatic. · Mouth/Throat: Lips appear moist. Oropharynx is clear and moist.  · Eyes: Conjunctivae normal. EOM are normal.   · Neck: Neck supple. No lymphadenopathy. No rigidity. No JVD present. · Cardiovascular: Normal rate, regular rhythm. Normal S1&S2. Carotid pulse 2+ bilaterally. · Pulmonary/Chest: Bilateral respiratory sounds present. No respiratory accessory muscle use. No wheezes, No rhonchi. · Abdominal: Soft. Normal bowel sounds present. No distension, No tenderness. No splenomegaly. No hernia. · Musculoskeletal: No tenderness. No edema    · Lymphadenopathy: Has no cervical adenopathy. · Neurological: Alert and oriented. Cranial nerve II-XII grossly intact, No gross deficit to touch. · Skin: Skin is warm and dry. No rash, lesions, ulcerations noted.   · Psychiatric: No anxiety nor agitation. Labs:  Reviewed. Recent Labs     02/24/21  0959      K 3.8      CO2 28   BUN 22*   CREATININE 1.2     Recent Labs     02/24/21  0959   WBC 4.5   HGB 13.7   HCT 39.9   MCV 98.7        No results found for: CKTOTAL, CKMB, CKMBINDEX, TROPONINI  No results found for: BNP  Lab Results   Component Value Date    PROTIME 11.5 02/24/2021    PROTIME 11.8 07/30/2020    INR 0.99 02/24/2021    INR 1.02 07/30/2020     No results found for: CHOL, HDL, TRIG    Diagnostic and imaging results reviewed. I independently reviewed the ECG and telemetry. Scheduled Meds:   sodium chloride flush  10 mL Intravenous 2 times per day    ceFAZolin  2,000 mg Intravenous Once     Continuous Infusions:   sodium chloride       PRN Meds:.sodium chloride flush     Assessment:   Patient Active Problem List    Diagnosis Date Noted    LV dysfunction     medtronic pacemaker 07/09/2020    Essential hypertension 06/30/2020    Mixed hyperlipidemia 06/30/2020    SSS (sick sinus syndrome) ([facility] [address] 06/30/2020    Leg edema 06/30/2020      There are no active hospital problems to display for this patient. Recommendation (s):    RV pacing induced cardiomyopathy  Complete heart block, status post single-chamber pacemaker implantation from [place] and [facility]  100% RV pacing  No obstructive coronary artery disease    Proceed with CRT P, under monitored anesthesia care    Patient had temp of 99.4. Clinically, she does not have any symptoms suggestive of infection. On physical examination, there is no signs of infection. Her WBC is within normal limits. Her CRP and pro calcitonin are within normal limits. Once, we decided to proceed with device implantation. Risks, benefits and alternatives were discussed with the patient and her son. Thank you for allowing me to participate in the care of Vineet Coles . If you have any questions/comments, please do not hesitate to contact us.       Esteban Ignacio MD   Cardiac Electrophysiology  [address]    For any EP related issues after 5 PM, contact [address] on call cardiology through .

## 2021-02-25 ENCOUNTER — PROCEDURE VISIT (OUTPATIENT)
Dept: CARDIOLOGY CLINIC | Age: 71
End: 2021-02-25
Payer: COMMERCIAL

## 2021-02-25 ENCOUNTER — APPOINTMENT (OUTPATIENT)
Dept: GENERAL RADIOLOGY | Age: 71
End: 2021-02-25
Attending: INTERNAL MEDICINE

## 2021-02-25 VITALS
OXYGEN SATURATION: 96 % | HEART RATE: 77 BPM | WEIGHT: 218 LBS | DIASTOLIC BLOOD PRESSURE: 68 MMHG | BODY MASS INDEX: 35.03 KG/M2 | RESPIRATION RATE: 16 BRPM | TEMPERATURE: 98.6 F | SYSTOLIC BLOOD PRESSURE: 130 MMHG | HEIGHT: 66 IN

## 2021-02-25 DIAGNOSIS — I49.5 SSS (SICK SINUS SYNDROME) (HCC): ICD-10-CM

## 2021-02-25 DIAGNOSIS — I42.8 NICM (NONISCHEMIC CARDIOMYOPATHY) (HCC): ICD-10-CM

## 2021-02-25 DIAGNOSIS — I51.9 LV DYSFUNCTION: ICD-10-CM

## 2021-02-25 DIAGNOSIS — Z95.0 CARDIAC RESYNCHRONIZATION THERAPY PACEMAKER (CRT-P) IN PLACE: ICD-10-CM

## 2021-02-25 PROCEDURE — 2580000003 HC RX 258: Performed by: INTERNAL MEDICINE

## 2021-02-25 PROCEDURE — 99217 PR OBSERVATION CARE DISCHARGE MANAGEMENT: CPT | Performed by: NURSE PRACTITIONER

## 2021-02-25 PROCEDURE — 71046 X-RAY EXAM CHEST 2 VIEWS: CPT

## 2021-02-25 PROCEDURE — G0378 HOSPITAL OBSERVATION PER HR: HCPCS

## 2021-02-25 PROCEDURE — 93281 PM DEVICE PROGR EVAL MULTI: CPT | Performed by: INTERNAL MEDICINE

## 2021-02-25 PROCEDURE — 6360000002 HC RX W HCPCS: Performed by: INTERNAL MEDICINE

## 2021-02-25 PROCEDURE — 6370000000 HC RX 637 (ALT 250 FOR IP): Performed by: INTERNAL MEDICINE

## 2021-02-25 RX ORDER — ACETAMINOPHEN 325 MG/1
650 TABLET ORAL EVERY 6 HOURS PRN
Status: DISCONTINUED | OUTPATIENT
Start: 2021-02-25 | End: 2021-02-25 | Stop reason: HOSPADM

## 2021-02-25 RX ADMIN — LOSARTAN POTASSIUM 50 MG: 50 TABLET, FILM COATED ORAL at 08:52

## 2021-02-25 RX ADMIN — METOPROLOL SUCCINATE 50 MG: 50 TABLET, EXTENDED RELEASE ORAL at 08:51

## 2021-02-25 RX ADMIN — SPIRONOLACTONE 12.5 MG: 25 TABLET, FILM COATED ORAL at 08:49

## 2021-02-25 RX ADMIN — ASPIRIN 81 MG: 81 TABLET, FILM COATED ORAL at 08:49

## 2021-02-25 RX ADMIN — ATORVASTATIN CALCIUM 10 MG: 10 TABLET, FILM COATED ORAL at 08:52

## 2021-02-25 RX ADMIN — CHLORTHALIDONE 12.5 MG: 25 TABLET ORAL at 08:51

## 2021-02-25 RX ADMIN — Medication 10 ML: at 09:31

## 2021-02-25 RX ADMIN — OXYCODONE 5 MG: 5 TABLET ORAL at 01:14

## 2021-02-25 RX ADMIN — CEFAZOLIN SODIUM 2000 MG: 2 SOLUTION INTRAVENOUS at 04:54

## 2021-02-25 ASSESSMENT — PAIN DESCRIPTION - PAIN TYPE
TYPE: ACUTE PAIN
TYPE: ACUTE PAIN

## 2021-02-25 ASSESSMENT — PAIN SCALES - GENERAL
PAINLEVEL_OUTOF10: 0
PAINLEVEL_OUTOF10: 5
PAINLEVEL_OUTOF10: 4

## 2021-02-25 ASSESSMENT — PAIN - FUNCTIONAL ASSESSMENT: PAIN_FUNCTIONAL_ASSESSMENT: ACTIVITIES ARE NOT PREVENTED

## 2021-02-25 ASSESSMENT — PAIN DESCRIPTION - LOCATION
LOCATION: HEAD
LOCATION: HEAD

## 2021-02-25 ASSESSMENT — PAIN DESCRIPTION - PROGRESSION: CLINICAL_PROGRESSION: NOT CHANGED

## 2021-02-25 ASSESSMENT — PAIN DESCRIPTION - ONSET: ONSET: GRADUAL

## 2021-02-25 ASSESSMENT — PAIN DESCRIPTION - DESCRIPTORS: DESCRIPTORS: ACHING;HEADACHE

## 2021-02-25 NOTE — CARE COORDINATION
Case Management Assessment            Discharge Note                    Date / Time of Note: 2/25/2021 11:39 AM                  Discharge Note Completed by: Marti Ayon    Patient Name: Juju Dover   YOB: 1950  Diagnosis: NICM (nonischemic cardiomyopathy) [facility]) [I42.8]   Date / Time: 2/24/2021  6:55 PM    Current PCP: No primary care provider on file. Clinic patient: {Yes/No:19726}    Hospitalization in the last 30 days: {Yes/No:19726}    Advance Directives:  Code Status: Full Code  [place] DNR form completed and on chart: {RESPONSES; YES/NO/NOT KFSYMUFXY:61910}    Financial:  Payor: [employer] / Plan: Edison Darling / Product Type: Indemnity /      Pharmacy:    [facility] [address] [place] [address] [phone]  [address] [place] [address]  Phone: [phone] Fax: [phone]    [facility] DR ADAM BRANCH 1 [facility], [address] [place] [phone] [facility] [phone]  [address] [facility],[address] [address]  Phone: [phone] Fax: [phone]      Assistance purchasing medications?:    Assistance provided by Case Management: { Assistance provided:15212}    Does patient want to participate in local refill/ meds to beds program?:      Meds To Beds General Rules:  1. Can ONLY be done Monday- Friday between 8:30am-5pm  2. Prescription(s) must be in pharmacy by 3pm to be filled same day  3. Copy of patient's insurance/ prescription drug card and patient face sheet must be sent along with the prescription(s)  4. Cost of Rx cannot be added to hospital bill. If financial assistance is needed, please contact unit  or ;   or  CANNOT provide pharmacy voucher for patients co-pays 5. Patients can then  the prescription on their way out of the hospital at discharge, or pharmacy can deliver to the bedside if staff is available. (payment due at time of pick-up or delivery - cash, check, or card accepted)     Able to afford home medications/ co-pay costs: {Yes/No:19726}    ADLS:  Current PT AM-PAC Score:   /24  Current OT AM-PAC Score:   /24      DISCHARGE Disposition: { Discharge Disposition:07360}    LOC at discharge: { LOC at Discharge:22771}  CARMINA Completed: {RESPONSES; YES/NO/NOT VCXHUPQAQ:13496}    Notification completed in HENS/PAS?:  {CM HENS:97169}    IMM Completed:   { GJY:45831}    Transportation:  Transportation PLAN for discharge: {DYO:94252}   Mode of Transport: { Mode of Transport:95259}  Reason for medical transport: { Reason for Medical Transport:46710}  Name of [address]: [employer] Transport Companies:77172}  Time of Transport: ***    Transport form completed: {RESPONSES; YES/NO/NOT XQVEPYOUNG:56355}    Home Care:  [address] ordered at discharge: {RESPONSES; YES/NO/NOT JQHTQNFPU:33527}  Home Care Agency: { Home Care Providers:65092}  Orders faxed: {Yes/No:19726}    Durable Medical Equipment:  DME Provider: ***  Equipment obtained during hospitalization: {EQUIPMENT:201183169}    Home Oxygen and Respiratory Equipment:  Oxygen needed at discharge?: {RESPONSES; YES/NO/NOT VMCJCYXMD:64654}  [address] [place] [address] St: { OXYGEN Providers:13348}  Portable tank available for discharge?: {RESPONSES; YES/NO/NOT ZQSINVCIU:98994}    Dialysis:  Dialysis patient: {Yes/No:19726}    Dialysis Center:  { HD Facilities:60785}    Hospice Services:  Location: { Hospice Service Location:70841}  Agency: { Hospice Agencies:29185}    Consents signed: {RESPONSES; YES/NO/NOT DKBXRKRIZ:19881}    Referrals made at [facility] for outpatient continued care:  { Discharge OP Referrals:44063}    Additional CM Notes: CM confirmed  D/c home    Family to transport home . Follow Up Instructions: To office in: Follow up in [address] in 1 week,  with Lit Ren NP in 1 month and Dr Tiesha Pittman in 3 month  Instructed Re: Discharge medications, activity and dietary restrictions and follow up appointments were discussed    New Rx:  None at this time . The Plan for Transition of Care is related to the following treatment goals of NICM (nonischemic cardiomyopathy) ([facility] [address] [I42.8]    The Patient and/or patient representative Coleen Stack and her family were provided with a choice of provider and agrees with the discharge plan {RESPONSES; YES/NO/NOT OAJZSOJCR:72143}    Freedom of choice list was provided with basic dialogue that supports the patient's individualized plan of care/goals and shares the quality data associated with the providers.  {RESPONSES; YES/NO/NOT RJLCZXJIT:66447}    Care Transitions patient: {Yes/No:19726}    Tamara Gagnon RN  [facility] [employer]  Case Management Department  Ph: ***  Fax: ***

## 2021-02-25 NOTE — DISCHARGE SUMMARY
[address] Discharge Note      Patient ID: Jorje Stephens 72 y.o. 1950    Admission Date: 2/24/2021     Discharge Date:  2/25/2021    Reason for Admission:  Principal Diagnosis: s/p bi-v pacemaker insertion   Secondary Diagnosis: non-ischemic cardiomyopathy, htn, hld    Procedures:  · Selective venography of left upper extremity. · Insertion of MRI compatible right atrial lead under fluoroscopy  · Insertion of MRI compatible left ventricular lead under fluoroscopy  · Removal of the previously placed single-chamber, ventricular pacemaker generator  · Implantation of a MRI compatible biventricular pacemaker generator  · Electronic analysis of lead and device. · Anesthesia: General Anesthesia as provided by anesthesia colleagues  ? Mallampati airway assessment class: I  ? ASA class: 1  · Automated blood loss less than 20 cc      Brief Summary of Patient's Course:  Jorje Stephens is a 72 y.o. female with non-ischemic cardiomyopathy, LVEF 35%, NYHA Class 2-3, QRS duration of more than 150 ms, s/p single-chamber pacemaker implantation from [place] [facility] [place], for history of complete heart block, currently, pacemaker dependent, who developed RV pacing cardiomyopathy, on optimal medical therapy with beta-blockers and ARB, for more than 9 months. Secondary to RV pacing cardiomyopathy, she was recommended to have upgrade of her single-chamber pacemaker to CRT P. She presented for Bi-V pacemaker implant. She tolerated the procedure well. She was monitored overnight and discharged home the next day. Discharge Condition:  Good     Discharge Instructions:   Patient instructed on wound care - no immersion in water, no shower for one week. Keep incision clean and dry. No elevation of arm over shoulder (ths implant side) for 4 weeks, use sling as a reminder and to help protect area. Patient is to expect some bruising and soreness at site as well as in the arm.  If site begins swelling more than current, oozing blood or other drainage, redness, warmth or increased tenderness, call the office to be evaluated immediately. Call the office for fever, chills, nausea, un-relieved pain. Patient needs follow up in device clinic in one week for wound check and device interrogation. Patient will be followed up in cardiology clinic afterward. Follow Up Instructions: To office in: Follow up in [address] in 1 week,  with Danny Peace NP in 1 month and Dr Neo Lloyd in 3 month  Instructed Re: Discharge medications, activity and dietary restrictions and follow up appointments were discussed.      Discharge Medications:   Maylon Dakins   Home Medication Instructions AWZ:[id number]    Printed on:02/25/21 1020   Medication Information                      aspirin EC 81 MG EC tablet  Take 1 tablet by mouth daily             chlorthalidone (HYGROTON) 25 MG tablet  Take 0.5 tablets by mouth daily             losartan (COZAAR) 50 MG tablet  Take 1 tablet by mouth daily             metoprolol succinate (TOPROL XL) 50 MG extended release tablet  Take 1 tablet by mouth daily             simvastatin (ZOCOR) 40 MG tablet  Take 1 tablet by mouth nightly             spironolactone (ALDACTONE) 25 MG tablet  Take 0.5 tablets by mouth daily                                      Attending Physician: Dr Manju Marie on Discharge: greater than 30 minutes

## 2021-02-25 NOTE — PROGRESS NOTES
Device interrogation by company representative@ [facility]-. See interrogation for further details. Upgrade crt-p 2/24/2021. next day check looks good. No issues. See PACEART report under Cardiology tab.

## 2021-02-25 NOTE — PROGRESS NOTES
CC: s/p bi-v pacemaker   HPI:   Babs Seay is a 72 y.o. female with non-ischemic cardiomyopathy, LVEF 35%, NYHA Class 2-3, QRS duration of more than 150 ms, s/p single-chamber pacemaker implantation from [facility], for history of complete heart block, currently, pacemaker dependent, who developed RV pacing cardiomyopathy, on optimal medical therapy with beta-blockers and ARB, for more than 9 months. Secondary to RV pacing cardiomyopathy, she was recommended to have upgrade of her single-chamber pacemaker to CRT P.    S: Denies cp, sob, or left arm pain or numbness    Tele: Paced     O:  Physical Exam:  BP (!) 166/79   Pulse 76   Temp 98.5 °F (36.9 °C) (Oral)   Resp 18   Ht 5' 6\" (1.676 m)   Wt 218 lb (98.9 kg)   SpO2 97%   BMI 35.19 kg/m²    General (appearance):  No acute distress  Eyes: anicteric   Neck: soft, No JVD  Ears/Nose/Mouth/Thorat: No cyanosis  CV: RRR   Respiratory:  Clear, normal effort  GI: soft, non-tender, non-distended  Skin: Warm, dry. No rashes  Neuro/Psych: Alert and oriented x 3. Appropriate behavior  Ext:  No c/c. No significant edema  Pulses:  2+ left radial.     I.O's=     Weight  Admission: Weight: 218 lb (98.9 kg)   Today: Weight: 218 lb (98.9 kg)    CBC:   Recent Labs     21  0959   WBC 4.5   HGB 13.7   HCT 39.9   MCV 98.7        BMP:   Recent Labs     21  0959      K 3.8      CO2 28   BUN 22*   CREATININE 1.2     Mag: No results found for: MG  PT/INR:   Recent Labs     21  0959   PROTIME 11.5   INR 0.99         Imagin2021 Device interrogation by company representative@ [facility]. See interrogation for further details. Upgrade crt-p 2021. next day check looks good. No issues. 2021 CXR:     Mild cardiomegaly.  Pacer device is in place.  Trace left pleural    effusion. 2021 Echo:   Left ventricular cavity size is normal. There is mild concentric left   ventricular hypertrophy.  Left ventricular function appears to be reduced   with an estimated ejection fraction of 40-45%. Abnormal septal motion. [place]   appears severely hypo- to akinetic. Inferior wall appears hypokinetic.     Assessment:  72 y.o. s/p Bi-V pacemaker  Issues:  -NICM LVEF 35%  -RV pacing cardiomyopathy   -CHB  -PPM    Plan:  ASA, statin  Chlorthalidone  Losartan   Toprol   Spironolactone     Follow up in device clinic in 1 week  Follow up with José Antonio Jackson NP in 1 month

## 2021-02-26 ENCOUNTER — CARE COORDINATION (OUTPATIENT)
Dept: CASE MANAGEMENT | Age: 71
End: 2021-02-26

## 2021-02-26 NOTE — CARE COORDINATION
Date/Time:  2/26/2021 10:33 AM  Attempted to reach patient by telephone. Call within 2 business days of discharge: Yes Left HIPPA compliant message requesting a return call. Will attempt to reach patient again.       Thank So Damon, RN  Care Transition Coordinator  Contact DEMARCO:[phone]

## 2021-03-01 ENCOUNTER — CARE COORDINATION (OUTPATIENT)
Dept: CASE MANAGEMENT | Age: 71
End: 2021-03-01

## 2021-03-03 ENCOUNTER — NURSE ONLY (OUTPATIENT)
Dept: CARDIOLOGY CLINIC | Age: 71
End: 2021-03-03
Payer: COMMERCIAL

## 2021-03-03 DIAGNOSIS — I42.8 NICM (NONISCHEMIC CARDIOMYOPATHY) (HCC): ICD-10-CM

## 2021-03-03 DIAGNOSIS — Z95.0 CARDIAC RESYNCHRONIZATION THERAPY PACEMAKER (CRT-P) IN PLACE: ICD-10-CM

## 2021-03-03 DIAGNOSIS — I51.9 LV DYSFUNCTION: ICD-10-CM

## 2021-03-03 DIAGNOSIS — I49.5 SSS (SICK SINUS SYNDROME) (HCC): ICD-10-CM

## 2021-03-03 PROCEDURE — 93281 PM DEVICE PROGR EVAL MULTI: CPT | Performed by: INTERNAL MEDICINE

## 2021-03-03 NOTE — PROGRESS NOTES
Wound and device check. Device upgrade from [facility] PPM to CRTP on 2/24/21 by GINI. Patient accompanied by son. Temp 97.3    Dressing removed from left chest device site. Incision is well approximated. Old drainage noted on steri strips. Discussed wound care and post op restrictions. Pt informed to call office if develops any fever,redness, increased swelling, or drainage/odor from site. Son confirmed home monitor received. Instructions given on how to initiate first transmission. Reviewed home monitoring. Pt/son voiced an understanding. Normal device check  6.7 years to VEL. Presenting ApBiVp @ 60-65 bpm  Underlying V dependent @ 35 bpm  AP 49.9%  BiVP 100 %   AT/AF 0%  0 episodes  Updated ID in   Follow up as scheduled.

## 2021-03-10 ENCOUNTER — OFFICE VISIT (OUTPATIENT)
Dept: CARDIOLOGY CLINIC | Age: 71
End: 2021-03-10
Payer: COMMERCIAL

## 2021-03-10 VITALS
BODY MASS INDEX: 35.51 KG/M2 | WEIGHT: 220 LBS | HEART RATE: 100 BPM | DIASTOLIC BLOOD PRESSURE: 80 MMHG | TEMPERATURE: 97.5 F | SYSTOLIC BLOOD PRESSURE: 132 MMHG | OXYGEN SATURATION: 97 %

## 2021-03-10 DIAGNOSIS — E78.2 MIXED HYPERLIPIDEMIA: ICD-10-CM

## 2021-03-10 DIAGNOSIS — I49.5 SSS (SICK SINUS SYNDROME) (HCC): ICD-10-CM

## 2021-03-10 DIAGNOSIS — I10 ESSENTIAL HYPERTENSION: ICD-10-CM

## 2021-03-10 DIAGNOSIS — I42.8 NICM (NONISCHEMIC CARDIOMYOPATHY) (HCC): ICD-10-CM

## 2021-03-10 DIAGNOSIS — Z95.0 PRESENCE OF CARDIAC RESYNCHRONIZATION THERAPY PACEMAKER (CRT-P): ICD-10-CM

## 2021-03-10 PROCEDURE — 99214 OFFICE O/P EST MOD 30 MIN: CPT | Performed by: INTERNAL MEDICINE

## 2021-03-10 RX ORDER — ASPIRIN 81 MG/1
81 TABLET ORAL DAILY
Qty: 90 TABLET | Refills: 3 | Status: SHIPPED | OUTPATIENT
Start: 2021-03-10 | End: 2022-01-26 | Stop reason: SDUPTHER

## 2021-03-10 RX ORDER — CHLORTHALIDONE 25 MG/1
12.5 TABLET ORAL DAILY
Qty: 45 TABLET | Refills: 3 | Status: SHIPPED | OUTPATIENT
Start: 2021-03-10 | End: 2021-12-22

## 2021-03-10 RX ORDER — SPIRONOLACTONE 25 MG/1
12.5 TABLET ORAL DAILY
Qty: 45 TABLET | Refills: 3 | Status: SHIPPED | OUTPATIENT
Start: 2021-03-10 | End: 2022-01-26 | Stop reason: SDUPTHER

## 2021-03-10 RX ORDER — SIMVASTATIN 40 MG
40 TABLET ORAL NIGHTLY
Qty: 90 TABLET | Refills: 3 | Status: SHIPPED | OUTPATIENT
Start: 2021-03-10 | End: 2022-01-26 | Stop reason: SDUPTHER

## 2021-03-10 RX ORDER — METOPROLOL SUCCINATE 50 MG/1
50 TABLET, EXTENDED RELEASE ORAL DAILY
Qty: 90 TABLET | Refills: 3 | Status: SHIPPED | OUTPATIENT
Start: 2021-03-10 | End: 2022-01-26 | Stop reason: SDUPTHER

## 2021-03-10 ASSESSMENT — ENCOUNTER SYMPTOMS
ABDOMINAL DISTENTION: 0
COUGH: 0
VOMITING: 0
ABDOMINAL PAIN: 0
SHORTNESS OF BREATH: 0
CHEST TIGHTNESS: 0
COLOR CHANGE: 0
WHEEZING: 0
BLOOD IN STOOL: 0
BACK PAIN: 0
EYE DISCHARGE: 0
FACIAL SWELLING: 0

## 2021-03-10 NOTE — PROGRESS NOTES
[facility]     Outpatient Cardiology         Chief Complaint   Patient presents with    Follow-up       HPI     James Friedman a 72 y.o. female here for CHF follow up. S/p upgrade to [facility] pacemaker on 2/24/21. Hx of HTN, PPM single chamber placed on 4/30/2017, upgrade to BiV ICD February 2021 CHF. Chronic systolic heart failure, compensated, will recheck function later in the year. HTN, controlled current medications, will refill medications  HLD, statin, tolerating well. SSS, properly working pacemaker. PMH  Past Medical History:   Diagnosis Date    Hyperlipidemia     Hypertension        PSH  Past Surgical History:   Procedure Laterality Date    PACEMAKER PLACEMENT          Social HIstory  Social History     Tobacco Use    Smoking status: Never Smoker    Smokeless tobacco: Never Used   Substance Use Topics    Alcohol use: Never     Frequency: Never    Drug use: Not on file       Family History  No family history on file. Allergies   No Known Allergies    Medications:     Home Medications:  Were reviewed and are listed in nursing record. and/or listed below    Prior to Admission medications    Medication Sig Start Date End Date Taking?  Authorizing Provider   spironolactone (ALDACTONE) 25 MG tablet Take 0.5 tablets by mouth daily 3/10/21  Yes Levi Cornejo MD   simvastatin (ZOCOR) 40 MG tablet Take 1 tablet by mouth nightly 3/10/21  Yes Levi Cornejo MD   metoprolol succinate (TOPROL XL) 50 MG extended release tablet Take 1 tablet by mouth daily 3/10/21  Yes Levi Cornejo MD   chlorthalidone (HYGROTON) 25 MG tablet Take 0.5 tablets by mouth daily 3/10/21  Yes Levi Cornejo MD   aspirin EC 81 MG EC tablet Take 1 tablet by mouth daily 3/10/21  Yes Levi Cornejo MD   losartan (COZAAR) 50 MG tablet Take 1 tablet by mouth daily 12/3/20  Yes Martha Uribe MD        Review of Systems   Constitutional: Negative for activity change, appetite change, diaphoresis, fatigue, fever and unexpected weight change. HENT: Negative for congestion, facial swelling, mouth sores and nosebleeds. Eyes: Negative for discharge and visual disturbance. Respiratory: Negative for cough, chest tightness, shortness of breath and wheezing. Cardiovascular: Negative for chest pain, palpitations and leg swelling. Gastrointestinal: Negative for abdominal distention, abdominal pain, blood in stool and vomiting. Endocrine: Negative for cold intolerance, heat intolerance and polyuria. Genitourinary: Negative for difficulty urinating, dysuria, frequency and hematuria. Musculoskeletal: Negative for back pain, joint swelling, myalgias and neck pain. Skin: Negative for color change, pallor and rash. Allergic/Immunologic: Negative for immunocompromised state. Neurological: Negative for dizziness, syncope, weakness, light-headedness, numbness and headaches. Hematological: Negative for adenopathy. Does not bruise/bleed easily. Psychiatric/Behavioral: Negative for behavioral problems, confusion, decreased concentration and suicidal ideas. The patient is not nervous/anxious. Vitals:    03/10/21 1047   BP: 132/80   Pulse: 100   Temp: 97.5 °F (36.4 °C)   SpO2: 97%    Weight: 220 lb (99.8 kg)       Vitals:    03/10/21 1047   BP: 132/80   Site: Left Upper Arm   Position: Sitting   Cuff Size: Large Adult   Pulse: 100   Temp: 97.5 °F (36.4 °C)   SpO2: 97%   Weight: 220 lb (99.8 kg)       BP Readings from Last 3 Encounters:   03/10/21 132/80   02/25/21 130/68   02/24/21 110/68       Wt Readings from Last 3 Encounters:   03/10/21 220 lb (99.8 kg)   02/24/21 218 lb (98.9 kg)   12/16/20 222 lb 6.4 oz (100.9 kg)       Physical Exam  Constitutional:       General: She is not in acute distress. Appearance: She is well-developed. She is not diaphoretic. HENT:      Head: Normocephalic and atraumatic.    Eyes:      Pupils: Pupils are equal, round, and reactive to light. Neck:      Musculoskeletal: Normal range of motion. Thyroid: No thyromegaly. Vascular: No JVD. Cardiovascular:      Rate and Rhythm: Normal rate and regular rhythm. Chest Wall: PMI is not displaced. Heart sounds: Normal heart sounds, S1 normal and S2 normal. No murmur. No friction rub. No gallop. Pulmonary:      Effort: Pulmonary effort is normal. No respiratory distress. Breath sounds: Normal breath sounds. No stridor. No wheezing or rales. Chest:      Chest wall: No tenderness. Abdominal:      General: Bowel sounds are normal. There is no distension. Palpations: Abdomen is soft. Tenderness: There is no abdominal tenderness. There is no guarding or rebound. Musculoskeletal: Normal range of motion. General: No tenderness. Lymphadenopathy:      Cervical: No cervical adenopathy. Skin:     General: Skin is warm and dry. Findings: No erythema or rash. Neurological:      Mental Status: She is alert and oriented to person, place, and time. Coordination: Coordination normal.   Psychiatric:         Behavior: Behavior normal.         Thought Content:  Thought content normal.         Judgment: Judgment normal.         Labs:       Lab Results   Component Value Date    WBC 4.5 02/24/2021    HGB 13.7 02/24/2021    HCT 39.9 02/24/2021    MCV 98.7 02/24/2021     02/24/2021     Lab Results   Component Value Date     02/24/2021    K 3.8 02/24/2021     02/24/2021    CO2 28 02/24/2021    BUN 22 (H) 02/24/2021    CREATININE 1.2 02/24/2021    GLUCOSE 136 (H) 02/24/2021    CALCIUM 10.5 02/24/2021    PROT 8.0 07/30/2020    LABALBU 5.0 07/30/2020    BILITOT 2.8 (H) 07/30/2020    ALKPHOS 72 07/30/2020    AST 20 07/30/2020    ALT 13 07/30/2020    LABGLOM 44 (A) 02/24/2021    GFRAA 54 (A) 02/24/2021    AGRATIO 1.7 07/30/2020    GLOB 3.0 07/30/2020         No results found for: CHOL  No results found for: TRIG  No results found for: HDL  No results found for: Georga Kappa  No results found for: LABVLDL, VLDL  No results found for: [facility]    Lab Results   Component Value Date    INR 0.99 02/24/2021    INR 1.02 07/30/2020    PROTIME 11.5 02/24/2021    PROTIME 11.8 07/30/2020       The ASCVD Risk score (Lindsay Bergeron, et al., 2013) failed to calculate for the following reasons:    Cannot find a previous HDL lab    Cannot find a previous total cholesterol lab    Unable to determine if patient is Non-       Imaging:       Last ECG (if available):  Paced    Last Monitor/Holter    Last Stress (if available):    Last Cath (if available): 7/30/20  Angiographic Findings:  Co dominant system  Left Main:  Normal     Left Anterior Descending:  Mild irregular plaque     Circumflex:  Mild irregular plaque     Right Coronary:  Mild irregular plaque     Left Ventriculogram:  LVEF ~40%. Distal anterior wall/apical dysfunction     Hemodynamics (mm Hg):  Left Ventricular Pressure:  143/2, 3  Central Aortic Pressure:  147/56 (87)     Conclusions:  -No obstructive CAD  -LV dysfunction with LVEF ~40% or so. Distal anterior/apical wall motion abnormality    Last TTE/JANIE(if available): 1/22/21   Left ventricular cavity size is normal. There is mild concentric left   ventricular hypertrophy. Left ventricular function appears to be reduced   with an estimated ejection fraction of 40-45%. Abnormal septal motion. [place]   appears severely hypo- to akinetic. Inferior wall appears hypokinetic. 7/10/20   Summary   Left ventricular cavity size is normal. There is mild concentric left   ventricular hypertrophy. Overall left ventricular systolic function appears   moderately reduced with an ejection fraction of 35%. Abnormal setpal motion. The apical septum and true apex are severely hypo- to akinetic. The apical   lateral wall is severely hypo- to akinetic. The anterolateral wall is mildly   hypokinetic. The anterior wall appears hypokinetic. Mild mitral regurgitation is present. Mild tricuspid regurgitation. Estimated pulmonary artery systolic pressure is at 27 mmHg assuming a right   atrial pressure of 3 mmHg. Last CMR  (if available):      Assessment / Plan:     Presence of cardiac resynchronization therapy pacemaker (CRT-P)-[employer]ONIC  Doing well site healing properly, no signs of infection. Will need to recheck EF later in the year. SSS (sick sinus syndrome) ([address]  Pacemaker working properly    Mixed hyperlipidemia  Simvastatin 40, no side effects    Essential hypertension  Controlled on current medications. We will continue    NICM (nonischemic cardiomyopathy) ([address]  Compensated, continue current medications. Follow up in 6 months. I had the opportunity to review the clinical symptoms and presentation of Zoila Ott. Patient's allergies and medications were reviewed and updated. Patient's past medical, surgical, social and family history were reviewed and updated. Patient's testing including laboratory, ECGs, monitor, imaging (TTE,JANIE,CMR,cath) were reviewed. Tobacco use was discussed with the patient and educated on the negative effects. I have asked the patient to not utilize these agents. All questions and concerns were addressed to the patient/family. Alternatives to my treatment were discussed. The note was completed using EMR. Every effort wasmade to ensure accuracy; however, inadvertent computerized transcription errors may be present. Thank you for allowing me to participate in thecare or Chapo Benson MD, [facility], [facility]

## 2021-03-18 ENCOUNTER — NURSE ONLY (OUTPATIENT)
Dept: CARDIOLOGY CLINIC | Age: 71
End: 2021-03-18

## 2021-03-26 NOTE — PROGRESS NOTES
[facility]   Electrophysiology  Office Visit  Date: 3/30/2021    Chief Complaint   Patient presents with    Bradycardia    Cardiomyopathy    Congestive Heart Failure    Hypertension       Cardiac HX: Shilpa Kelly is a 72 y.o. woman with a h/o HTN, HLD, NICMP, EF 35% (7/2020), LHC showed no obstructive CAD, SSS, s/p single ch MDT PPM (formerly [facility], 3/30/2017), s/p upgrade to MDT CRT-P (2/24/21, Dr. Natalie Clayton). Interval History/HPI: Patient is here for follow-up for sick sinus syndrome, PPM management, and NICMP. Patient [place] from [place] in February 2020 is now being followed by cardiology in our practice. She complained of chest pain when she had seen Dr. Tomy Oritz in June 2020. She underwent an echo that showed an EF of 35%. She underwent a left heart cath in July 2020 that showed no obstructive CAD. She has been on GD OMT and underwent an upgrade to the Medtronic CRT- P on 2/24/2021. She presents in the office today by ventricularly paced. Her left chest incision has healed well. She states she occasionally feels a little discomfort in that area and has been using ice. She occasionally complains of a stabbing pain underneath her right breast and is not having now. She denies PND, orthopnea or lower extremity edema. She has no dizziness, lightheadedness or syncopal events.     Home medications:   Current Outpatient Medications on File Prior to Visit   Medication Sig Dispense Refill    spironolactone (ALDACTONE) 25 MG tablet Take 0.5 tablets by mouth daily 45 tablet 3    simvastatin (ZOCOR) 40 MG tablet Take 1 tablet by mouth nightly 90 tablet 3    metoprolol succinate (TOPROL XL) 50 MG extended release tablet Take 1 tablet by mouth daily 90 tablet 3    chlorthalidone (HYGROTON) 25 MG tablet Take 0.5 tablets by mouth daily 45 tablet 3    aspirin EC 81 MG EC tablet Take 1 tablet by mouth daily 90 tablet 3    losartan (COZAAR) 50 MG tablet Take 1 tablet by mouth daily 90 tablet 3     No current facility-administered medications on file prior to visit. Past Medical History:   Diagnosis Date    Hyperlipidemia     Hypertension         Past Surgical History:   Procedure Laterality Date    PACEMAKER PLACEMENT         No Known Allergies    Social History:  Reviewed. reports that she has never smoked. She has never used smokeless tobacco. She reports that she does not drink alcohol. Family History:  Reviewed. family history is not on file. Review of System:    · Constitutional: No fevers, chills. · Eyes: No visual changes or diplopia. No scleral icterus. · ENT: No Headaches. No mouth sores or sore throat. · Cardiovascular: No for chest pain, Yes for dyspnea on exertion, No for palpitations or No for loss of consciousness. No cough, hemoptysis, No for pleuritic pain, or phlebitis. · Respiratory: No for cough or wheezing. No hematemesis. · Gastrointestinal: No abdominal pain, blood in stools. · Genitourinary: No dysuria, or hematuria. · Musculoskeletal: No gait disturbance,    · Integumentary: No rash or pruritis. · Neurological: No headache, change in muscle strength, numbness or tingling. · Psychiatric: No anxiety, or depression. · Endocrine: No temperature intolerance. No excessive thirst, fluid intake, or urination. · Hem/Lymph: No abnormal bruising or bleeding, blood clots or swollen lymph nodes. · Allergic/Immunologic: No nasal congestion or hives. Physical Examination:  Vitals:    03/30/21 1322   BP: 130/82   Pulse: 81   Temp: 97.5 °F (36.4 °C)         Wt Readings from Last 3 Encounters:   03/30/21 217 lb 3.2 oz (98.5 kg)   03/10/21 220 lb (99.8 kg)   02/24/21 218 lb (98.9 kg)       · Constitutional: Oriented. No distress. · Head: Normocephalic and atraumatic. · Mouth/Throat: Oropharynx is clear and moist.   · Eyes: Conjunctivae clear without jaunduice. PERRL. · Neck: Neck supple. No rigidity. No JVD present.     · Cardiovascular: Normal rate, regular rhythm, S1&S2.    · Pulmonary/Chest: Bilateral respiratory sounds. No wheezes, No rhonchi. · Abdominal: Soft. Bowel sounds present. No distension, No tenderness. · Musculoskeletal: No tenderness. No edema    · Lymphadenopathy: Has no cervical adenopathy. · Neurological: Alert and oriented. Cranial nerve appears intact, No Gross deficit   · Skin: Skin is warm and dry. No rash noted. · Psychiatric: Has a normal mood, affect and behavior     Labs:  Reviewed. No results for input(s): NA, K, CL, CO2, PHOS, BUN, CREATININE in the last 72 hours. Invalid input(s): CA,  TSH  No results for input(s): WBC, HGB, HCT, MCV, PLT in the last 72 hours. No results found for: CKTOTAL, CKMB, CKMBINDEX, TROPONINI  No results found for: BNP  Lab Results   Component Value Date    PROTIME 11.5 02/24/2021    PROTIME 11.8 07/30/2020    INR 0.99 02/24/2021    INR 1.02 07/30/2020     No results found for: CHOL, HDL, TRIG    ECG: Personally reviewed: AV sequential pacing, HR 81, , , QTc 466    ECHO:  1.22.2021   Summary   Limited echo    Left ventricular cavity size is normal. There is mild concentric left   ventricular hypertrophy. Left ventricular function appears to be reduced   with an estimated ejection fraction of 40-45%. Abnormal septal motion. [place]   appears severely hypo- to akinetic. Inferior wall appears hypokinetic. 7/10/2020  Summary   Left ventricular cavity size is normal. There is mild concentric left   ventricular hypertrophy. Overall left ventricular systolic function appears   moderately reduced with an ejection fraction of 35%. Abnormal setpal motion. The apical septum and true apex are severely hypo- to akinetic. The apical   lateral wall is severely hypo- to akinetic. The anterolateral wall is mildly   hypokinetic. The anterior wall appears hypokinetic. Mild mitral regurgitation is present. Mild tricuspid regurgitation.    Estimated pulmonary artery systolic pressure is at 27 mmHg assuming a right   atrial pressure of 3 mmHg. Stress Test: N/A    Cardiac Angiography: 7/30/2020  Angiographic Findings:  Co dominant system  Left Main:  Normal  Left Anterior Descending:  Mild irregular plaque   Circumflex:  Mild irregular plaque   Right Coronary:  Mild irregular plaque   Left Ventriculogram:  LVEF ~40%. Distal anterior wall/apical dysfunction   Hemodynamics (mm Hg):  Left Ventricular Pressure:  143/2, 3  Central Aortic Pressure:  147/56 (87)   Conclusions:  -No obstructive CAD  -LV dysfunction with LVEF ~40% or so. Distal anterior/apical wall motion abnormality   Recommendations:  -Maximize medical therapy    Problem List:   Patient Active Problem List    Diagnosis Date Noted    Pacemaker     NICM (nonischemic cardiomyopathy) ([address] 02/24/2021    LV dysfunction     Presence of cardiac resynchronization therapy pacemaker (CRT-P)-[employer] 07/09/2020    Essential hypertension 06/30/2020    Mixed hyperlipidemia 06/30/2020    SSS (sick sinus syndrome) ([address] 06/30/2020    Leg edema 06/30/2020        Assessment:   1. SSS (sick sinus syndrome) ([address]    2. Complete heart block ([address]    3. Pacemaker    4. Nonischemic cardiomyopathy ([address]    5. Essential hypertension    6. Coronary artery disease involving native coronary artery of native heart without angina pectoris        Cardiac HX: Martell Murrell is a 72 y.o. woman with a h/o HTN, HLD, NICMP, EF 35% (7/2020), LHC showed no obstructive CAD, SSS, s/p single ch MDT PPM ([place] [facility], 3/30/2017), s/p upgrade to MDT CRT-P (2/24/21, Dr. Jordin Torres). SSS  - S/p single-chamber MDT PPM  - Device check today shows 79.3% AP, 100% , 2 nonsustained VT episodes with the longest lasting 30 beats in length.   - Follow-up with Dr. Jordin Torres in 3 months- ECG ordered and results personally reviewed     NICMP  - EF 40-45%  - NYHA class II  -  paced  - On losartan 50 mg daily, Toprol-XL 50 mg daily, spironolactone 12.5 mg daily    HTN  - BP  - On chlorthalidone 12.5 mg daily, losartan 50 mg daily, Toprol-XL 50 mg daily, Aldactone 12.5 mg daily    Nonobstructive CAD  - On ASA, statin    EF of 07-66%  ACEi for systolic HF  ASA and Statin for CAD  Anticoagulation for AF and heart failure    Tobacco use was discussed with the patient and education provided. All questions and concerns were addressed to the patient/family. Alternatives to my treatment were discussed. The note was completed using EMR. Every effort was made to ensure accuracy; however, inadvertent computerized transcription errors may be present. Patient received education regarding their diagnosis, treatment and medications while in the office today.       Nisha Cardoso [address] [facility]

## 2021-03-30 ENCOUNTER — NURSE ONLY (OUTPATIENT)
Dept: CARDIOLOGY CLINIC | Age: 71
End: 2021-03-30
Payer: COMMERCIAL

## 2021-03-30 ENCOUNTER — OFFICE VISIT (OUTPATIENT)
Dept: CARDIOLOGY CLINIC | Age: 71
End: 2021-03-30
Payer: COMMERCIAL

## 2021-03-30 VITALS
BODY MASS INDEX: 34.09 KG/M2 | TEMPERATURE: 97.5 F | HEIGHT: 67 IN | SYSTOLIC BLOOD PRESSURE: 130 MMHG | HEART RATE: 81 BPM | DIASTOLIC BLOOD PRESSURE: 82 MMHG | WEIGHT: 217.2 LBS

## 2021-03-30 DIAGNOSIS — I25.10 CORONARY ARTERY DISEASE INVOLVING NATIVE CORONARY ARTERY OF NATIVE HEART WITHOUT ANGINA PECTORIS: ICD-10-CM

## 2021-03-30 DIAGNOSIS — Z95.0 CARDIAC RESYNCHRONIZATION THERAPY PACEMAKER (CRT-P) IN PLACE: ICD-10-CM

## 2021-03-30 DIAGNOSIS — I51.9 LV DYSFUNCTION: ICD-10-CM

## 2021-03-30 DIAGNOSIS — I42.8 NONISCHEMIC CARDIOMYOPATHY (HCC): ICD-10-CM

## 2021-03-30 DIAGNOSIS — I49.5 SSS (SICK SINUS SYNDROME) (HCC): Primary | ICD-10-CM

## 2021-03-30 DIAGNOSIS — I49.5 SSS (SICK SINUS SYNDROME) (HCC): ICD-10-CM

## 2021-03-30 DIAGNOSIS — I42.8 NICM (NONISCHEMIC CARDIOMYOPATHY) (HCC): ICD-10-CM

## 2021-03-30 DIAGNOSIS — I44.2 COMPLETE HEART BLOCK (HCC): ICD-10-CM

## 2021-03-30 DIAGNOSIS — Z95.0 PACEMAKER: ICD-10-CM

## 2021-03-30 DIAGNOSIS — I10 ESSENTIAL HYPERTENSION: ICD-10-CM

## 2021-03-30 PROCEDURE — 93000 ELECTROCARDIOGRAM COMPLETE: CPT | Performed by: NURSE PRACTITIONER

## 2021-03-30 PROCEDURE — 93281 PM DEVICE PROGR EVAL MULTI: CPT | Performed by: INTERNAL MEDICINE

## 2021-03-30 PROCEDURE — 99214 OFFICE O/P EST MOD 30 MIN: CPT | Performed by: NURSE PRACTITIONER

## 2021-03-30 NOTE — ASSESSMENT & PLAN NOTE
Compensated, continue current medications.
Doing well site healing properly, no signs of infection. Will need to recheck EF later in the year.
Pacemaker working properly
Simvastatin 40, no side effects
Teodora Butt
yes

## 2021-04-08 NOTE — PROGRESS NOTES
Device check and ov w/NPFW  Interrogation by [employer] rep  See paceart report under cardiology tab. Follow up 3 months w/UL.

## 2021-06-16 ENCOUNTER — TELEPHONE (OUTPATIENT)
Dept: CARDIOLOGY CLINIC | Age: 71
End: 2021-06-16

## 2021-06-16 NOTE — TELEPHONE ENCOUNTER
Spoke with patient's son. Denies any fever or bumping into anything. Made a device and wound check for Friday morning.

## 2021-06-18 ENCOUNTER — NURSE ONLY (OUTPATIENT)
Dept: CARDIOLOGY CLINIC | Age: 71
End: 2021-06-18
Payer: COMMERCIAL

## 2021-06-18 ENCOUNTER — TELEPHONE (OUTPATIENT)
Dept: CARDIOLOGY CLINIC | Age: 71
End: 2021-06-18

## 2021-06-18 DIAGNOSIS — Z95.0 CARDIAC RESYNCHRONIZATION THERAPY PACEMAKER (CRT-P) IN PLACE: ICD-10-CM

## 2021-06-18 PROCEDURE — 93281 PM DEVICE PROGR EVAL MULTI: CPT | Performed by: INTERNAL MEDICINE

## 2021-06-18 NOTE — TELEPHONE ENCOUNTER
CG,  Patient came into the office today c/o of pain, discomfort at her device site. Recent upgrade to CRTP on 2/24/21 by DERECK Kovacs RN and myself assessed the area. No signs of infection, swelling, bruising, fever, drainage. I did not do a complete device interrogation, just a quick look. Battery life, impedance and trends are good. Patient stated she has been picking up her grandchild a lot and hold them. Instructed patient to use a ice pack. Any OTC recommendations? Patient's number [phone]  Please advise.

## 2021-06-21 NOTE — TELEPHONE ENCOUNTER
Patient informed. Stated device site no longer bothering her. Advised if should reoccur, use ice packs and otc tylenol as instructed in below message.

## 2021-07-26 NOTE — PROGRESS NOTES
[address]   Electrophysiology  Office Visit  Date: 8/3/2021    Chief Complaint   Patient presents with    Cardiomyopathy    Congestive Heart Failure    Hypertension       Cardiac HX: Shabbir Cook is a 80 y.o. woman with a h/o HTN, HLD, NICMP, EF 35% (7/2020), LHC showed no obstructive CAD, SSS, s/p single ch MDT PPM ([facility], 3/30/2017), s/p upgrade to MDT CRT-P (2/24/21, Dr. Ghislaine Espinal). Interval History/HPI: Patient is here for follow-up for SSS, PPM management and NICMP. Patient moved from [place] in February 2020. She originally was seen by Dr. Kaela Hart in June 2020 complaining of chest pain. She underwent an echo that showed an EF of 35%. She had a left heart cath in July 2020 that showed no obstructive CAD. She had been on GD OMT and underwent an upgrade to Medtronic CRT-P on 2/24/2021. Review of her device today shows that she atrially paces 68.2% of the time and ventricularly paces 99.9% of the time. She had 1 NSVT episode lasting 2 seconds in length. She denies any heart racing, palpitations or irregular heartbeats. Dates that last week she had a sharp pain underneath her right breast and the top of her abdomen along with a headache. She states that the symptoms are gone now but she is wondering what it was. She denies any other chest discomfort, shortness of breath, PND, orthopnea or lower extremity edema. Her left chest incision is healed well. Her blood pressure recheck in the office today was 124/78.       Home medications:   Current Outpatient Medications on File Prior to Visit   Medication Sig Dispense Refill    spironolactone (ALDACTONE) 25 MG tablet Take 0.5 tablets by mouth daily 45 tablet 3    simvastatin (ZOCOR) 40 MG tablet Take 1 tablet by mouth nightly 90 tablet 3    metoprolol succinate (TOPROL XL) 50 MG extended release tablet Take 1 tablet by mouth daily 90 tablet 3    chlorthalidone (HYGROTON) 25 MG tablet Take 0.5 tablets by mouth daily 45 tablet 3    aspirin EC 81 MG EC tablet Take 1 tablet by mouth daily 90 tablet 3    losartan (COZAAR) 50 MG tablet Take 1 tablet by mouth daily 90 tablet 3     No current facility-administered medications on file prior to visit. Past Medical History:   Diagnosis Date    Hyperlipidemia     Hypertension         Past Surgical History:   Procedure Laterality Date    PACEMAKER PLACEMENT         No Known Allergies    Social History:  Reviewed. reports that she has never smoked. She has never used smokeless tobacco. She reports that she does not drink alcohol. Family History:  Reviewed. family history is not on file. Review of System:    · Constitutional: No fevers, chills. · Eyes: No visual changes or diplopia. No scleral icterus. · ENT: No Headaches. No mouth sores or sore throat. · Cardiovascular: No for chest pain, Yes for dyspnea on exertion, No for palpitations or No for loss of consciousness. No cough, hemoptysis, No for pleuritic pain, or phlebitis. · Respiratory: No for cough or wheezing. No hematemesis. · Gastrointestinal: No abdominal pain, blood in stools. · Genitourinary: No dysuria, or hematuria. · Musculoskeletal: No gait disturbance,    · Integumentary: No rash or pruritis. · Neurological: No headache, change in muscle strength, numbness or tingling. · Psychiatric: No anxiety, or depression. · Endocrine: No temperature intolerance. No excessive thirst, fluid intake, or urination. · Hem/Lymph: No abnormal bruising or bleeding, blood clots or swollen lymph nodes. · Allergic/Immunologic: No nasal congestion or hives. Physical Examination:  Vitals:    08/03/21 1455   BP: 112/78   Pulse: 79         Wt Readings from Last 3 Encounters:   08/03/21 215 lb 3.2 oz (97.6 kg)   03/30/21 217 lb 3.2 oz (98.5 kg)   03/10/21 220 lb (99.8 kg)       · Constitutional: Oriented. No distress. · Head: Normocephalic and atraumatic.    · Mouth/Throat: Oropharynx is clear and moist.   · Eyes: Conjunctivae clear without jaunduice. PERRL. · Neck: Neck supple. No rigidity. No JVD present. · Cardiovascular: Normal rate, regular rhythm, S1&S2. · Pulmonary/Chest: Bilateral respiratory sounds. No wheezes, No rhonchi. · Abdominal: Soft. Bowel sounds present. No distension, No tenderness. · Musculoskeletal: No tenderness. No edema    · Lymphadenopathy: Has no cervical adenopathy. · Neurological: Alert and oriented. Cranial nerve appears intact, No Gross deficit   · Skin: Skin is warm and dry. No rash noted. · Psychiatric: Has a normal mood, affect and behavior     Labs:  Reviewed. No results for input(s): NA, K, CL, CO2, PHOS, BUN, CREATININE in the last 72 hours. Invalid input(s): CA,  TSH  No results for input(s): WBC, HGB, HCT, MCV, PLT in the last 72 hours. No results found for: CKTOTAL, CKMB, CKMBINDEX, TROPONINI  No results found for: BNP  Lab Results   Component Value Date    PROTIME 11.5 02/24/2021    PROTIME 11.8 07/30/2020    INR 0.99 02/24/2021    INR 1.02 07/30/2020     No results found for: CHOL, HDL, TRIG    ECG: Personally reviewed: A sensed, V paced, HR 79, , QTc 419     ECHO:  1.22.2021   Summary   Limited echo    Left ventricular cavity size is normal. There is mild concentric left   ventricular hypertrophy. Left ventricular function appears to be reduced   with an estimated ejection fraction of 40-45%. Abnormal septal motion. [place]   appears severely hypo- to akinetic. Inferior wall appears hypokinetic. 7/10/2020 (Complete)  Summary   Left ventricular cavity size is normal. There is mild concentric left   ventricular hypertrophy. Overall left ventricular systolic function appears   moderately reduced with an ejection fraction of 35%. Abnormal setpal motion. The apical septum and true apex are severely hypo- to akinetic. The apical   lateral wall is severely hypo- to akinetic. The anterolateral wall is mildly   hypokinetic. The anterior wall appears hypokinetic.    Mild mitral regurgitation is present. Mild tricuspid regurgitation. Estimated pulmonary artery systolic pressure is at 27 mmHg assuming a right   atrial pressure of 3 mmHg. Stress Test: N/A    Cardiac Angiography: 7/30/2020 Malcolm Cooper  Angiographic Findings:  Co dominant system  Left Main:  Normal  Left Anterior Descending:  Mild irregular plaque   Circumflex:  Mild irregular plaque   Right Coronary:  Mild irregular plaque   Left Ventriculogram:  LVEF ~40%. Distal anterior wall/apical dysfunction   Hemodynamics (mm Hg):  Left Ventricular Pressure:  143/2, 3  Central Aortic Pressure:  147/56 (87)   Conclusions:  -No obstructive CAD  -LV dysfunction with LVEF ~40% or so. Distal anterior/apical wall motion abnormality   Recommendations:  -Maximize medical therapy    Problem List:   Patient Active Problem List    Diagnosis Date Noted    Pacemaker     NICM (nonischemic cardiomyopathy) ([address] 02/24/2021    LV dysfunction     Presence of cardiac resynchronization therapy pacemaker (CRT-P)-[employer] 07/09/2020    Essential hypertension 06/30/2020    Mixed hyperlipidemia 06/30/2020    SSS (sick sinus syndrome) ([address] 06/30/2020    Leg edema 06/30/2020        Assessment:   1. SSS (sick sinus syndrome) ([address]    2. Cardiac resynchronization therapy pacemaker (CRT-P) in place    3. Nonischemic cardiomyopathy ([address]    4. Chronic systolic CHF (congestive heart failure) ([address]    5. Benign essential HTN        Cardiac HX: Gregg Fowler is a 80 y.o. woman with a h/o HTN, HLD, NICMP, EF 35% (7/2020), LHC showed no obstructive CAD, SSS, s/p single ch MDT PPM ([place] [facility], 3/30/2017), s/p upgrade to MDT CRT-P (2/24/21, Dr. Coleman Escoto). SSS  - S/p single-chamber MDT PPM, s/p upgrade to a MDT CRT-P  - Device check today shows 68.2% AP, 99.9 , 1 nonsustained VT episode lasting 2 secs in length.   - Follow-up with Dr. Coleman Escoto in 6 months  - ECG ordered and results personally reviewed     NICMP/chronic sCHF  - Appears to be euvolemic  - EF as low as 35%, now 40-45%  - NYHA class II  -   paced  - On losartan 50 mg daily, Toprol-XL 50 mg daily, spironolactone 12.5 mg daily    HTN  - BP well controlled , on recheck - 124/78  - On chlorthalidone 12.5 mg daily, losartan 50 mg daily, Toprol-XL 50 mg daily, Aldactone 12.5 mg daily    Nonobstructive CAD  - No s/s  - On ASA, statin    EF of 89-60%   ARB for systolic HF  ASA and Statin for CAD  Anticoagulation for AF   No Tobacco use. All questions and concerns were addressed to the patient/family. Alternatives to my treatment were discussed. The note was completed using EMR. Every effort was made to ensure accuracy; however, inadvertent computerized transcription errors may be present. Patient received education regarding their diagnosis, treatment and medications while in the office today.       Rema Fig[address]ero[address]

## 2021-08-03 ENCOUNTER — OFFICE VISIT (OUTPATIENT)
Dept: CARDIOLOGY CLINIC | Age: 71
End: 2021-08-03

## 2021-08-03 ENCOUNTER — NURSE ONLY (OUTPATIENT)
Dept: CARDIOLOGY CLINIC | Age: 71
End: 2021-08-03

## 2021-08-03 VITALS
BODY MASS INDEX: 35.85 KG/M2 | HEART RATE: 79 BPM | SYSTOLIC BLOOD PRESSURE: 112 MMHG | HEIGHT: 65 IN | WEIGHT: 215.2 LBS | DIASTOLIC BLOOD PRESSURE: 78 MMHG

## 2021-08-03 DIAGNOSIS — I10 BENIGN ESSENTIAL HTN: ICD-10-CM

## 2021-08-03 DIAGNOSIS — Z95.0 CARDIAC RESYNCHRONIZATION THERAPY PACEMAKER (CRT-P) IN PLACE: ICD-10-CM

## 2021-08-03 DIAGNOSIS — I49.5 SSS (SICK SINUS SYNDROME) (HCC): Primary | ICD-10-CM

## 2021-08-03 DIAGNOSIS — I49.5 SSS (SICK SINUS SYNDROME) (HCC): ICD-10-CM

## 2021-08-03 DIAGNOSIS — I42.8 NONISCHEMIC CARDIOMYOPATHY (HCC): ICD-10-CM

## 2021-08-03 DIAGNOSIS — I50.22 CHRONIC SYSTOLIC CHF (CONGESTIVE HEART FAILURE) (HCC): ICD-10-CM

## 2021-08-03 DIAGNOSIS — I51.9 LV DYSFUNCTION: ICD-10-CM

## 2021-08-03 DIAGNOSIS — I25.10 CORONARY ARTERY DISEASE INVOLVING NATIVE CORONARY ARTERY OF NATIVE HEART WITHOUT ANGINA PECTORIS: ICD-10-CM

## 2021-08-03 DIAGNOSIS — I42.8 NICM (NONISCHEMIC CARDIOMYOPATHY) (HCC): ICD-10-CM

## 2021-08-03 PROCEDURE — 99214 OFFICE O/P EST MOD 30 MIN: CPT | Performed by: NURSE PRACTITIONER

## 2021-08-03 PROCEDURE — 93000 ELECTROCARDIOGRAM COMPLETE: CPT | Performed by: NURSE PRACTITIONER

## 2021-08-05 PROCEDURE — 93296 REM INTERROG EVL PM/IDS: CPT | Performed by: INTERNAL MEDICINE

## 2021-08-05 PROCEDURE — 93294 REM INTERROG EVL PM/LDLS PM: CPT | Performed by: INTERNAL MEDICINE

## 2021-08-05 NOTE — PROGRESS NOTES
CARELINK EXPRESS USED. Remote transmission of Medtronic CRT-P shows normal cardiac device function. Patient's last complete device interrogation was on 3/30/21. Estimated device longevity is 10 years. Patient has a history of SSS, NICMP. Takes metoprolol, chlorthalidone. AP 68.2%   99.9%  %   Since last device interrogation on 6/18/21-1 Non-sustained VT on 7/18/21 for 6 beats lasting <03 secs. 176 bpm.  Optivol within normal limits. LI MD to review. Patient is to follow up in 3 months either remotely or in clinic. Please see the Paceart report under the Cardiology tab for more detail.

## 2021-09-09 ASSESSMENT — ENCOUNTER SYMPTOMS
BLOOD IN STOOL: 0
ABDOMINAL DISTENTION: 0
FACIAL SWELLING: 0
EYE DISCHARGE: 0
COLOR CHANGE: 0
ABDOMINAL PAIN: 0
BACK PAIN: 0
CHEST TIGHTNESS: 0
WHEEZING: 0
SHORTNESS OF BREATH: 0
COUGH: 0
VOMITING: 0

## 2021-09-09 NOTE — PROGRESS NOTES
[facility]     Outpatient Cardiology         Chief Complaint   Patient presents with    Hypertension       HPI     Daniele Guthrie a 80 y.o. female here for CHF follow up. S/p upgrade to [facility] pacemaker on 2/24/21. Hx of HTN, PPM single chamber placed on 4/30/2017, upgrade to BiV ICD February 2021 CHF. Chronic systolic heart failure, compensated, tolerating current medications including losartan and metoprolol. HTN, controlled current medications, will refill medications  HLD, statin, tolerating well. SSS, properly working pacemaker. PMH  Past Medical History:   Diagnosis Date    Hyperlipidemia     Hypertension        PSH  Past Surgical History:   Procedure Laterality Date    PACEMAKER PLACEMENT          Social HIstory  Social History     Tobacco Use    Smoking status: Never Smoker    Smokeless tobacco: Never Used   Substance Use Topics    Alcohol use: Never    Drug use: Not on file       Family History  History reviewed. No pertinent family history. Allergies   No Known Allergies    Medications:     Home Medications:  Were reviewed and are listed in nursing record. and/or listed below    Prior to Admission medications    Medication Sig Start Date End Date Taking?  Authorizing Provider   spironolactone (ALDACTONE) 25 MG tablet Take 0.5 tablets by mouth daily 3/10/21  Yes Chepe Mann MD   simvastatin (ZOCOR) 40 MG tablet Take 1 tablet by mouth nightly 3/10/21  Yes Chepe Mann MD   metoprolol succinate (TOPROL XL) 50 MG extended release tablet Take 1 tablet by mouth daily 3/10/21  Yes Chepe Mann MD   chlorthalidone (HYGROTON) 25 MG tablet Take 0.5 tablets by mouth daily 3/10/21  Yes Chepe Mann MD   aspirin EC 81 MG EC tablet Take 1 tablet by mouth daily 3/10/21  Yes Chepe Mann MD   losartan (COZAAR) 50 MG tablet Take 1 tablet by mouth daily 12/3/20  Yes Mukesh Vergara MD        Review of Systems   Constitutional: Negative for activity change, appetite change, diaphoresis, fatigue, fever and unexpected weight change. HENT: Negative for congestion, facial swelling, mouth sores and nosebleeds. Eyes: Negative for discharge and visual disturbance. Respiratory: Negative for cough, chest tightness, shortness of breath and wheezing. Cardiovascular: Negative for chest pain, palpitations and leg swelling. Gastrointestinal: Negative for abdominal distention, abdominal pain, blood in stool and vomiting. Endocrine: Negative for cold intolerance, heat intolerance and polyuria. Genitourinary: Negative for difficulty urinating, dysuria, frequency and hematuria. Musculoskeletal: Negative for back pain, joint swelling, myalgias and neck pain. Skin: Negative for color change, pallor and rash. Allergic/Immunologic: Negative for immunocompromised state. Neurological: Negative for dizziness, syncope, weakness, light-headedness, numbness and headaches. Hematological: Negative for adenopathy. Does not bruise/bleed easily. Psychiatric/Behavioral: Negative for behavioral problems, confusion, decreased concentration and suicidal ideas. The patient is not nervous/anxious. Vitals:    09/13/21 1334   BP: 122/60   Pulse: 92    Weight: 217 lb 6.4 oz (98.6 kg)       Vitals:    09/13/21 1334   BP: 122/60   Site: Left Upper Arm   Position: Sitting   Cuff Size: Large Adult   Pulse: 92   Weight: 217 lb 6.4 oz (98.6 kg)   Height: 5' 5\" (1.651 m)       BP Readings from Last 3 Encounters:   09/13/21 122/60   08/03/21 112/78   03/30/21 130/82       Wt Readings from Last 3 Encounters:   09/13/21 217 lb 6.4 oz (98.6 kg)   08/03/21 215 lb 3.2 oz (97.6 kg)   03/30/21 217 lb 3.2 oz (98.5 kg)       Physical Exam  Constitutional:       General: She is not in acute distress. Appearance: She is well-developed. She is not diaphoretic. HENT:      Head: Normocephalic and atraumatic.    Eyes:      Pupils: Pupils are equal, round, and reactive to light.   Neck:      Thyroid: No thyromegaly. Vascular: No JVD. Cardiovascular:      Rate and Rhythm: Normal rate and regular rhythm. Chest Wall: PMI is not displaced. Heart sounds: Normal heart sounds, S1 normal and S2 normal. No murmur heard. No friction rub. No gallop. Pulmonary:      Effort: Pulmonary effort is normal. No respiratory distress. Breath sounds: Normal breath sounds. No stridor. No wheezing or rales. Chest:      Chest wall: No tenderness. Abdominal:      General: Bowel sounds are normal. There is no distension. Palpations: Abdomen is soft. Tenderness: There is no abdominal tenderness. There is no guarding or rebound. Musculoskeletal:         General: No tenderness. Normal range of motion. Cervical back: Normal range of motion. Lymphadenopathy:      Cervical: No cervical adenopathy. Skin:     General: Skin is warm and dry. Findings: No erythema or rash. Neurological:      Mental Status: She is alert and oriented to person, place, and time. Coordination: Coordination normal.   Psychiatric:         Behavior: Behavior normal.         Thought Content:  Thought content normal.         Judgment: Judgment normal.         Labs:       Lab Results   Component Value Date    WBC 4.5 02/24/2021    HGB 13.7 02/24/2021    HCT 39.9 02/24/2021    MCV 98.7 02/24/2021     02/24/2021     Lab Results   Component Value Date     02/24/2021    K 3.8 02/24/2021     02/24/2021    CO2 28 02/24/2021    BUN 22 (H) 02/24/2021    CREATININE 1.2 02/24/2021    GLUCOSE 136 (H) 02/24/2021    CALCIUM 10.5 02/24/2021    PROT 8.0 07/30/2020    LABALBU 5.0 07/30/2020    BILITOT 2.8 (H) 07/30/2020    ALKPHOS 72 07/30/2020    AST 20 07/30/2020    ALT 13 07/30/2020    LABGLOM 44 (A) 02/24/2021    GFRAA 54 (A) 02/24/2021    AGRATIO 1.7 07/30/2020    GLOB 3.0 07/30/2020         No results found for: CHOL  No results found for: TRIG  No results found for: HDL  No results found for: Ana Rocio  No results found for: LABVLDL, VLDL  No results found for: [facility]    Lab Results   Component Value Date    INR 0.99 02/24/2021    INR 1.02 07/30/2020    PROTIME 11.5 02/24/2021    PROTIME 11.8 07/30/2020       The ASCVD Risk score (Johanna Meza, et al., 2013) failed to calculate for the following reasons:    Cannot find a previous HDL lab    Cannot find a previous total cholesterol lab    Unable to determine if patient is Non-       Imaging:       Last ECG (if available):  Paced    Last Monitor/Holter    Last Stress (if available):    Last Cath (if available): 7/30/20  Angiographic Findings:  Co dominant system  Left Main:  Normal     Left Anterior Descending:  Mild irregular plaque     Circumflex:  Mild irregular plaque     Right Coronary:  Mild irregular plaque     Left Ventriculogram:  LVEF ~40%. Distal anterior wall/apical dysfunction     Hemodynamics (mm Hg):  Left Ventricular Pressure:  143/2, 3  Central Aortic Pressure:  147/56 (87)     Conclusions:  -No obstructive CAD  -LV dysfunction with LVEF ~40% or so. Distal anterior/apical wall motion abnormality    Last TTE/JANIE(if available): 1/22/21   Left ventricular cavity size is normal. There is mild concentric left   ventricular hypertrophy. Left ventricular function appears to be reduced   with an estimated ejection fraction of 40-45%. Abnormal septal motion. [place]   appears severely hypo- to akinetic. Inferior wall appears hypokinetic. 7/10/20   Summary   Left ventricular cavity size is normal. There is mild concentric left   ventricular hypertrophy. Overall left ventricular systolic function appears   moderately reduced with an ejection fraction of 35%. Abnormal setpal motion. The apical septum and true apex are severely hypo- to akinetic. The apical   lateral wall is severely hypo- to akinetic. The anterolateral wall is mildly   hypokinetic. The anterior wall appears hypokinetic. Mild mitral regurgitation is present. Mild tricuspid regurgitation. Estimated pulmonary artery systolic pressure is at 27 mmHg assuming a right   atrial pressure of 3 mmHg. Last CMR  (if available):      Assessment / Plan:     NICM (nonischemic cardiomyopathy) (HCC)  Continue losartan and metoprolol. Continue spironolactone. Limited echo to assess LV function after BiV upgrade. If EF remains reduced, will consider starting Entresto, insurance may be a problem. Presence of cardiac resynchronization therapy pacemaker (CRT-P)-MEDTRONIC  Working properly, euvolemic. Essential hypertension  Controlled on chlorthalidone, losartan, metoprolol, Aldactone    Mixed hyperlipidemia  Tolerating well simvastatin. Follow up in 6 months. I had the opportunity to review the clinical symptoms and presentation of Sonal Calvo. Patient's allergies and medications were reviewed and updated. Patient's past medical, surgical, social and family history were reviewed and updated. Patient's testing including laboratory, ECGs, monitor, imaging (TTE,JANIE,CMR,cath) were reviewed. Tobacco use was discussed with the patient and educated on the negative effects. I have asked the patient to not utilize these agents. All questions and concerns were addressed to the patient/family. Alternatives to my treatment were discussed. The note was completed using EMR. Every effort wasmade to ensure accuracy; however, inadvertent computerized transcription errors may be present. Thank you for allowing me to participate in thecare or Justin Castañeda MD, [facility], [facility] [place] [facility]

## 2021-09-13 ENCOUNTER — OFFICE VISIT (OUTPATIENT)
Dept: CARDIOLOGY CLINIC | Age: 71
End: 2021-09-13

## 2021-09-13 VITALS
HEIGHT: 65 IN | BODY MASS INDEX: 36.22 KG/M2 | DIASTOLIC BLOOD PRESSURE: 60 MMHG | HEART RATE: 92 BPM | SYSTOLIC BLOOD PRESSURE: 122 MMHG | WEIGHT: 217.4 LBS

## 2021-09-13 DIAGNOSIS — I42.8 NICM (NONISCHEMIC CARDIOMYOPATHY) (HCC): ICD-10-CM

## 2021-09-13 DIAGNOSIS — I10 ESSENTIAL HYPERTENSION: ICD-10-CM

## 2021-09-13 DIAGNOSIS — I50.9 CONGESTIVE HEART FAILURE, UNSPECIFIED HF CHRONICITY, UNSPECIFIED HEART FAILURE TYPE (HCC): Primary | ICD-10-CM

## 2021-09-13 DIAGNOSIS — E78.2 MIXED HYPERLIPIDEMIA: ICD-10-CM

## 2021-09-13 DIAGNOSIS — Z95.0 PRESENCE OF CARDIAC RESYNCHRONIZATION THERAPY PACEMAKER (CRT-P): ICD-10-CM

## 2021-09-13 PROCEDURE — 99214 OFFICE O/P EST MOD 30 MIN: CPT | Performed by: INTERNAL MEDICINE

## 2021-09-13 NOTE — ASSESSMENT & PLAN NOTE
Continue losartan and metoprolol. Continue spironolactone. Limited echo to assess LV function after BiV upgrade. If EF remains reduced, will consider starting Entresto, insurance may be a problem.

## 2021-09-17 ENCOUNTER — PROCEDURE VISIT (OUTPATIENT)
Dept: CARDIOLOGY CLINIC | Age: 71
End: 2021-09-17
Payer: COMMERCIAL

## 2021-09-17 DIAGNOSIS — I50.9 CONGESTIVE HEART FAILURE, UNSPECIFIED HF CHRONICITY, UNSPECIFIED HEART FAILURE TYPE (HCC): ICD-10-CM

## 2021-09-17 PROCEDURE — 93325 DOPPLER ECHO COLOR FLOW MAPG: CPT | Performed by: INTERNAL MEDICINE

## 2021-09-17 PROCEDURE — 93308 TTE F-UP OR LMTD: CPT | Performed by: INTERNAL MEDICINE

## 2021-11-02 ENCOUNTER — NURSE ONLY (OUTPATIENT)
Dept: CARDIOLOGY CLINIC | Age: 71
End: 2021-11-02
Payer: COMMERCIAL

## 2021-11-02 DIAGNOSIS — I49.5 SSS (SICK SINUS SYNDROME) (HCC): ICD-10-CM

## 2021-11-02 DIAGNOSIS — I42.8 NICM (NONISCHEMIC CARDIOMYOPATHY) (HCC): ICD-10-CM

## 2021-11-02 DIAGNOSIS — Z95.0 PRESENCE OF CARDIAC RESYNCHRONIZATION THERAPY PACEMAKER (CRT-P): ICD-10-CM

## 2021-11-02 DIAGNOSIS — I50.22 CHRONIC SYSTOLIC HEART FAILURE (HCC): ICD-10-CM

## 2021-11-02 DIAGNOSIS — I51.9 LV DYSFUNCTION: ICD-10-CM

## 2021-11-03 PROCEDURE — 93296 REM INTERROG EVL PM/IDS: CPT | Performed by: INTERNAL MEDICINE

## 2021-11-03 PROCEDURE — 93297 REM INTERROG DEV EVAL ICPMS: CPT | Performed by: INTERNAL MEDICINE

## 2021-11-03 PROCEDURE — 93294 REM INTERROG EVL PM/LDLS PM: CPT | Performed by: INTERNAL MEDICINE

## 2021-11-03 NOTE — PROGRESS NOTES
We received remote transmission from patient's CRT-P monitor at home. Transmission shows normal sensing and pacing function. No new arrhythmias/events recorded. Ap 71.1%  BiVp 99.9%, Effective 99.9%    Optivol is within normal range. EP physician will review. See interrogation under cardiology tab in the [address] [place] [address] [address] field for more details. Will continue to monitor remotely.

## 2021-12-20 ASSESSMENT — ENCOUNTER SYMPTOMS
BLOOD IN STOOL: 0
CHEST TIGHTNESS: 0
FACIAL SWELLING: 0
ABDOMINAL DISTENTION: 0
BACK PAIN: 0
EYE DISCHARGE: 0
VOMITING: 0
SHORTNESS OF BREATH: 0
WHEEZING: 0
COLOR CHANGE: 0
COUGH: 0
ABDOMINAL PAIN: 0

## 2021-12-20 NOTE — PROGRESS NOTES
[address] [facility]     Outpatient Cardiology         Chief Complaint   Patient presents with    Cardiac Clearance     for upcoming hysterectomy procedure       HPI     Alix Arciniega a 80 y.o. female here for CV risk assessment for hysterectomy. Hx of HTN, PPM single chamber placed on 4/30/2017, upgrade to BiV ICD February 1767  Chronic systolic heart failure, compensated, tolerating current medications including losartan and metoprolol. Will adjust heart failure medications and discontinue losartan, start low-dose Entresto. We will continue metoprolol. Overall feeling great from a heart failure perspective. HTN, controlled current medications, will adjust heart failure medications  HLD, statin, tolerating well. SSS, properly working pacemaker. PMH  Past Medical History:   Diagnosis Date    Hyperlipidemia     Hypertension        PSH  Past Surgical History:   Procedure Laterality Date    PACEMAKER PLACEMENT          Social HIstory  Social History     Tobacco Use    Smoking status: Never Smoker    Smokeless tobacco: Never Used   Substance Use Topics    Alcohol use: Never    Drug use: Not on file       Family History  History reviewed. No pertinent family history. Allergies   No Known Allergies    Medications:     Home Medications:  Were reviewed and are listed in nursing record. and/or listed below    Prior to Admission medications    Medication Sig Start Date End Date Taking?  Authorizing Provider   sacubitril-valsartan (ENTRESTO) 24-26 MG per tablet Take 1 tablet by mouth 2 times daily 12/22/21  Yes Krishna Decker MD   spironolactone (ALDACTONE) 25 MG tablet Take 0.5 tablets by mouth daily 3/10/21  Yes Krishna Decker MD   simvastatin (ZOCOR) 40 MG tablet Take 1 tablet by mouth nightly 3/10/21  Yes Krishna Decker MD   metoprolol succinate (TOPROL XL) 50 MG extended release tablet Take 1 tablet by mouth daily 3/10/21  Yes Krishna Decker MD   aspirin EC 81 MG EC tablet Take 1 tablet by mouth daily 3/10/21  Yes Janiya Pepper MD        Review of Systems   Constitutional: Negative for activity change, appetite change, diaphoresis, fatigue, fever and unexpected weight change. HENT: Negative for congestion, facial swelling, mouth sores and nosebleeds. Eyes: Negative for discharge and visual disturbance. Respiratory: Negative for cough, chest tightness, shortness of breath and wheezing. Cardiovascular: Negative for chest pain, palpitations and leg swelling. Gastrointestinal: Negative for abdominal distention, abdominal pain, blood in stool and vomiting. Endocrine: Negative for cold intolerance, heat intolerance and polyuria. Genitourinary: Negative for difficulty urinating, dysuria, frequency and hematuria. Musculoskeletal: Negative for back pain, joint swelling, myalgias and neck pain. Skin: Negative for color change, pallor and rash. Allergic/Immunologic: Negative for immunocompromised state. Neurological: Negative for dizziness, syncope, weakness, light-headedness, numbness and headaches. Hematological: Negative for adenopathy. Does not bruise/bleed easily. Psychiatric/Behavioral: Negative for behavioral problems, confusion, decreased concentration and suicidal ideas. The patient is not nervous/anxious. Vitals:    12/22/21 1005   BP: 138/80   Pulse: 86    Weight: 219 lb 3.2 oz (99.4 kg)       Vitals:    12/22/21 1005   BP: 138/80   Site: Right Upper Arm   Position: Sitting   Cuff Size: Large Adult   Pulse: 86   Weight: 219 lb 3.2 oz (99.4 kg)       BP Readings from Last 3 Encounters:   12/22/21 138/80   09/13/21 122/60   08/03/21 112/78       Wt Readings from Last 3 Encounters:   12/22/21 219 lb 3.2 oz (99.4 kg)   09/13/21 217 lb 6.4 oz (98.6 kg)   08/03/21 215 lb 3.2 oz (97.6 kg)       Physical Exam  Constitutional:       General: She is not in acute distress. Appearance: She is well-developed. She is not diaphoretic.    HENT: Head: Normocephalic and atraumatic. Eyes:      Pupils: Pupils are equal, round, and reactive to light. Neck:      Thyroid: No thyromegaly. Vascular: No JVD. Cardiovascular:      Rate and Rhythm: Normal rate and regular rhythm. Chest Wall: PMI is not displaced. Heart sounds: Normal heart sounds, S1 normal and S2 normal. No murmur heard. No friction rub. No gallop. Pulmonary:      Effort: Pulmonary effort is normal. No respiratory distress. Breath sounds: Normal breath sounds. No stridor. No wheezing or rales. Chest:      Chest wall: No tenderness. Abdominal:      General: Bowel sounds are normal. There is no distension. Palpations: Abdomen is soft. Tenderness: There is no abdominal tenderness. There is no guarding or rebound. Musculoskeletal:         General: No tenderness. Normal range of motion. Cervical back: Normal range of motion. Lymphadenopathy:      Cervical: No cervical adenopathy. Skin:     General: Skin is warm and dry. Findings: No erythema or rash. Neurological:      Mental Status: She is alert and oriented to person, place, and time. Coordination: Coordination normal.   Psychiatric:         Behavior: Behavior normal.         Thought Content:  Thought content normal.         Judgment: Judgment normal.         Labs:       Lab Results   Component Value Date    WBC 4.5 02/24/2021    HGB 13.7 02/24/2021    HCT 39.9 02/24/2021    MCV 98.7 02/24/2021     02/24/2021     Lab Results   Component Value Date     02/24/2021    K 3.8 02/24/2021     02/24/2021    CO2 28 02/24/2021    BUN 22 (H) 02/24/2021    CREATININE 1.2 02/24/2021    GLUCOSE 136 (H) 02/24/2021    CALCIUM 10.5 02/24/2021    PROT 8.0 07/30/2020    LABALBU 5.0 07/30/2020    BILITOT 2.8 (H) 07/30/2020    ALKPHOS 72 07/30/2020    AST 20 07/30/2020    ALT 13 07/30/2020    LABGLOM 44 (A) 02/24/2021    GFRAA 54 (A) 02/24/2021    AGRATIO 1.7 07/30/2020    GLOB 3.0 Abnormal septal motion. [place]   appears severely hypo- to akinetic. Inferior wall appears hypokinetic. 7/10/20   Summary   Left ventricular cavity size is normal. There is mild concentric left   ventricular hypertrophy. Overall left ventricular systolic function appears   moderately reduced with an ejection fraction of 35%. Abnormal setpal motion. The apical septum and true apex are severely hypo- to akinetic. The apical   lateral wall is severely hypo- to akinetic. The anterolateral wall is mildly   hypokinetic. The anterior wall appears hypokinetic. Mild mitral regurgitation is present. Mild tricuspid regurgitation. Estimated pulmonary artery systolic pressure is at 27 mmHg assuming a right   atrial pressure of 3 mmHg. Last CMR  (if available):      Assessment / Plan:     Presence of cardiac resynchronization therapy pacemaker (CRT-P)-MEDTRONIC  Working properly, has BiV ICD for heart failure    Chronic systolic congestive heart failure ([address]  Compensated. Discontinue losartan and discontinue chlorthalidone. Continue metoprolol 50 daily. Start low-dose Entresto. Renal panel in a week. Preop cardiovascular exam  Her heart failure is compensated. She has an acceptable risk (low risk) to proceed with surgery. No need for further work-up prior to surgery. Follow up in 6 months. I had the opportunity to review the clinical symptoms and presentation of Dillan Espinoza. Patient's allergies and medications were reviewed and updated. Patient's past medical, surgical, social and family history were reviewed and updated. Patient's testing including laboratory, ECGs, monitor, imaging (TTE,JANIE,CMR,cath) were reviewed. Tobacco use was discussed with the patient and educated on the negative effects. I have asked the patient to not utilize these agents. All questions and concerns were addressed to the patient/family. Alternatives to my treatment were discussed.  The note was completed using EMR. Every effort wasmade to ensure accuracy; however, inadvertent computerized transcription errors may be present. Thank you for allowing me to participate in thewaldo or Serina Agee MD, [facility]

## 2021-12-21 ENCOUNTER — NURSE ONLY (OUTPATIENT)
Dept: CARDIOLOGY CLINIC | Age: 71
End: 2021-12-21
Payer: COMMERCIAL

## 2021-12-21 DIAGNOSIS — Z95.0 PRESENCE OF CARDIAC RESYNCHRONIZATION THERAPY PACEMAKER (CRT-P): ICD-10-CM

## 2021-12-21 DIAGNOSIS — I51.9 LV DYSFUNCTION: ICD-10-CM

## 2021-12-21 DIAGNOSIS — I50.22 CHRONIC SYSTOLIC (CONGESTIVE) HEART FAILURE (HCC): ICD-10-CM

## 2021-12-21 DIAGNOSIS — I49.5 SSS (SICK SINUS SYNDROME) (HCC): ICD-10-CM

## 2021-12-21 DIAGNOSIS — I42.8 NICM (NONISCHEMIC CARDIOMYOPATHY) (HCC): ICD-10-CM

## 2021-12-21 RX ORDER — LOSARTAN POTASSIUM 50 MG/1
TABLET ORAL
Qty: 90 TABLET | Refills: 0 | Status: SHIPPED | OUTPATIENT
Start: 2021-12-21 | End: 2021-12-22

## 2021-12-21 NOTE — PROGRESS NOTES
We received remote transmission from patient's CRT-P monitor at home. Transmission shows normal sensing and pacing function. No new arrhythmias/events recorded. Ap 66.8%  BiVp 100%, Effective 100%    Optivol is within normal range. EP physician will review. See interrogation under cardiology tab in the [address] [place] [address] [address] field for more details. Will continue to monitor remotely.

## 2021-12-21 NOTE — TELEPHONE ENCOUNTER
Requested Prescriptions     Pending Prescriptions Disp Refills    losartan (COZAAR) 50 MG tablet [Pharmacy Med Name: Losartan Potassium 50 MG Oral Tablet] 90 tablet 0     Sig: Take 1 tablet by mouth once daily          Number: 90    Refills: 3    Last Office Visit: 9/13/2021     Next Office Visit: 12/22/2021     Last Labs: [phone]

## 2021-12-22 ENCOUNTER — OFFICE VISIT (OUTPATIENT)
Dept: CARDIOLOGY CLINIC | Age: 71
End: 2021-12-22
Payer: COMMERCIAL

## 2021-12-22 VITALS
HEART RATE: 86 BPM | WEIGHT: 219.2 LBS | DIASTOLIC BLOOD PRESSURE: 80 MMHG | BODY MASS INDEX: 36.48 KG/M2 | SYSTOLIC BLOOD PRESSURE: 138 MMHG

## 2021-12-22 DIAGNOSIS — I42.8 NICM (NONISCHEMIC CARDIOMYOPATHY) (HCC): ICD-10-CM

## 2021-12-22 DIAGNOSIS — I51.9 LV DYSFUNCTION: ICD-10-CM

## 2021-12-22 DIAGNOSIS — Z01.810 PREOP CARDIOVASCULAR EXAM: ICD-10-CM

## 2021-12-22 DIAGNOSIS — I10 ESSENTIAL HYPERTENSION: ICD-10-CM

## 2021-12-22 DIAGNOSIS — Z95.0 PRESENCE OF CARDIAC RESYNCHRONIZATION THERAPY PACEMAKER (CRT-P): ICD-10-CM

## 2021-12-22 DIAGNOSIS — I50.22 CHRONIC SYSTOLIC CONGESTIVE HEART FAILURE (HCC): ICD-10-CM

## 2021-12-22 DIAGNOSIS — I49.5 SSS (SICK SINUS SYNDROME) (HCC): Primary | ICD-10-CM

## 2021-12-22 PROCEDURE — 99214 OFFICE O/P EST MOD 30 MIN: CPT | Performed by: INTERNAL MEDICINE

## 2021-12-22 PROCEDURE — 93000 ELECTROCARDIOGRAM COMPLETE: CPT | Performed by: INTERNAL MEDICINE

## 2021-12-22 PROCEDURE — G2066 INTER DEVC REMOTE 30D: HCPCS | Performed by: INTERNAL MEDICINE

## 2021-12-22 PROCEDURE — 93297 REM INTERROG DEV EVAL ICPMS: CPT | Performed by: INTERNAL MEDICINE

## 2021-12-22 NOTE — LETTER
[address]  [facility] [address] [id number] [facility]. [address] [place] [address]  Phone: [phone]  Fax: [phone]      12/22/21,  10:20 AM      No further cardiac testing is recommended for the planned surgical procedure for Dragan Garcia 80 y.o., 1950. She is at acceptable cardiac risk for her planned surgical procedure. Please feel free to call my office with any questions.           Sincerely,          Dr. Basilio Elizondo

## 2021-12-22 NOTE — ASSESSMENT & PLAN NOTE
Her heart failure is compensated. She has an acceptable risk (low risk) to proceed with surgery. No need for further work-up prior to surgery.

## 2021-12-22 NOTE — ASSESSMENT & PLAN NOTE
Compensated. Discontinue losartan and discontinue chlorthalidone. Continue metoprolol 50 daily. Start low-dose Entresto. Renal panel in a week.

## 2022-01-21 PROBLEM — Z01.810 PREOP CARDIOVASCULAR EXAM: Status: RESOLVED | Noted: 2021-12-22 | Resolved: 2022-01-21

## 2022-01-25 ENCOUNTER — TELEPHONE (OUTPATIENT)
Dept: CARDIOLOGY CLINIC | Age: 72
End: 2022-01-25

## 2022-01-25 ENCOUNTER — NURSE ONLY (OUTPATIENT)
Dept: CARDIOLOGY CLINIC | Age: 72
End: 2022-01-25
Payer: COMMERCIAL

## 2022-01-25 DIAGNOSIS — I42.8 NICM (NONISCHEMIC CARDIOMYOPATHY) (HCC): ICD-10-CM

## 2022-01-25 DIAGNOSIS — I50.22 CHRONIC SYSTOLIC CONGESTIVE HEART FAILURE (HCC): ICD-10-CM

## 2022-01-25 DIAGNOSIS — Z95.0 PRESENCE OF CARDIAC RESYNCHRONIZATION THERAPY PACEMAKER (CRT-P): ICD-10-CM

## 2022-01-25 DIAGNOSIS — I49.5 SSS (SICK SINUS SYNDROME) (HCC): ICD-10-CM

## 2022-01-25 PROCEDURE — 93297 REM INTERROG DEV EVAL ICPMS: CPT | Performed by: NURSE PRACTITIONER

## 2022-01-25 PROCEDURE — G2066 INTER DEVC REMOTE 30D: HCPCS | Performed by: NURSE PRACTITIONER

## 2022-01-25 NOTE — TELEPHONE ENCOUNTER
We received remote transmission from patient's CRT-P monitor at home. Possible Optivol fluid accumulation 01.16.22-ongoing, currently elevated slightly above 60 threshold w correlating TI trend below reference line. Please contact pt to assess for possible CHF sx.

## 2022-01-25 NOTE — PROGRESS NOTES
We received remote transmission from patient's CRT-P monitor at home. Transmission shows normal sensing and pacing function. No new arrhythmias/events recorded. Ap 61.5%  BiVp 100%, Effective 100%    Possible Optivol fluid accumulation 01.16.22-ongoing, currently elevated slightly above 60 threshold w correlating TI trend below reference line. Office staff notified to contact pt to assess for possible CHF sx. NP will review. See interrogation under cardiology tab in the [address] [place] [address] [address] field for more details. Will continue to monitor remotely.

## 2022-01-26 RX ORDER — SPIRONOLACTONE 25 MG/1
12.5 TABLET ORAL DAILY
Qty: 45 TABLET | Refills: 3 | Status: SHIPPED | OUTPATIENT
Start: 2022-01-26 | End: 2022-05-31

## 2022-01-26 RX ORDER — ASPIRIN 81 MG/1
81 TABLET ORAL DAILY
Qty: 90 TABLET | Refills: 3 | Status: SHIPPED | OUTPATIENT
Start: 2022-01-26 | End: 2022-09-22 | Stop reason: SDUPTHER

## 2022-01-26 RX ORDER — SIMVASTATIN 40 MG
40 TABLET ORAL NIGHTLY
Qty: 90 TABLET | Refills: 3 | Status: SHIPPED | OUTPATIENT
Start: 2022-01-26 | End: 2022-05-17

## 2022-01-26 RX ORDER — METOPROLOL SUCCINATE 50 MG/1
50 TABLET, EXTENDED RELEASE ORAL DAILY
Qty: 90 TABLET | Refills: 3 | Status: SHIPPED | OUTPATIENT
Start: 2022-01-26 | End: 2022-03-10 | Stop reason: ALTCHOICE

## 2022-01-26 NOTE — TELEPHONE ENCOUNTER
Spoke with patient's son. Patient out of medications, sent prescriptions to [facility]. Also gave samples of entresto and 10$ a month card. Patient coming in Monday 1/31/22 to be evaluated.

## 2022-01-27 ASSESSMENT — ENCOUNTER SYMPTOMS
ABDOMINAL DISTENTION: 0
SHORTNESS OF BREATH: 0
EYE DISCHARGE: 0
CHEST TIGHTNESS: 0
VOMITING: 0
ABDOMINAL PAIN: 0
COLOR CHANGE: 0
BACK PAIN: 0
COUGH: 0
WHEEZING: 0
BLOOD IN STOOL: 0
FACIAL SWELLING: 0

## 2022-01-27 NOTE — PROGRESS NOTES
[facility]     Outpatient Cardiology         Chief Complaint   Patient presents with    Follow-up       HPI     Golden Brumfield a 80 y.o. female here for follow up for HFrEF and discuss medications. Hx of HTN, PPM single chamber placed on 4/30/2017, upgrade to BiV ICD February 9141  Chronic sy.stolic heart failure, compensated, started Entresto during last visit doing very well with it. Obtaining Marilou Gip may be an issue though. HTN, controlled on current dose of Entresto metoprolol. HLD, statin, tolerating well. SSS, properly working pacemaker. Overall feeling really good, main problem will be to get Marilou Gip    PMH  Past Medical History:   Diagnosis Date    Hyperlipidemia     Hypertension        PSH  Past Surgical History:   Procedure Laterality Date    PACEMAKER PLACEMENT          Social HIstory  Social History     Tobacco Use    Smoking status: Never Smoker    Smokeless tobacco: Never Used   Substance Use Topics    Alcohol use: Never    Drug use: Not on file       Family History  No family history on file. Allergies   No Known Allergies    Medications:     Home Medications:  Were reviewed and are listed in nursing record. and/or listed below    Prior to Admission medications    Medication Sig Start Date End Date Taking?  Authorizing Provider   sacubitril-valsartan (ENTRESTO) 24-26 MG per tablet Take 1 tablet by mouth 2 times daily 1/26/22  Yes SAMMY Moscoso CNP   spironolactone (ALDACTONE) 25 MG tablet Take 0.5 tablets by mouth daily 1/26/22  Yes SAMMY Moscoso CNP   simvastatin (ZOCOR) 40 MG tablet Take 1 tablet by mouth nightly 1/26/22  Yes SAMMY Moscoso CNP   metoprolol succinate (TOPROL XL) 50 MG extended release tablet Take 1 tablet by mouth daily 1/26/22  Yes SAMMY Moscoso CNP   aspirin EC 81 MG EC tablet Take 1 tablet by mouth daily 1/26/22  Yes SAMMY Moscoso CNP        Review of Systems   Constitutional: Negative for activity rate and regular rhythm. Chest Wall: PMI is not displaced. Heart sounds: Normal heart sounds, S1 normal and S2 normal. No murmur heard. No friction rub. No gallop. Pulmonary:      Effort: Pulmonary effort is normal. No respiratory distress. Breath sounds: Normal breath sounds. No stridor. No wheezing or rales. Chest:      Chest wall: No tenderness. Abdominal:      General: Bowel sounds are normal. There is no distension. Palpations: Abdomen is soft. Tenderness: There is no abdominal tenderness. There is no guarding or rebound. Musculoskeletal:         General: No tenderness. Normal range of motion. Cervical back: Normal range of motion. Lymphadenopathy:      Cervical: No cervical adenopathy. Skin:     General: Skin is warm and dry. Findings: No erythema or rash. Neurological:      Mental Status: She is alert and oriented to person, place, and time. Coordination: Coordination normal.   Psychiatric:         Behavior: Behavior normal.         Thought Content:  Thought content normal.         Judgment: Judgment normal.         Labs:       Lab Results   Component Value Date    WBC 4.5 02/24/2021    HGB 13.7 02/24/2021    HCT 39.9 02/24/2021    MCV 98.7 02/24/2021     02/24/2021     Lab Results   Component Value Date     02/24/2021    K 3.8 02/24/2021     02/24/2021    CO2 28 02/24/2021    BUN 22 (H) 02/24/2021    CREATININE 1.2 02/24/2021    GLUCOSE 136 (H) 02/24/2021    CALCIUM 10.5 02/24/2021    PROT 8.0 07/30/2020    LABALBU 5.0 07/30/2020    BILITOT 2.8 (H) 07/30/2020    ALKPHOS 72 07/30/2020    AST 20 07/30/2020    ALT 13 07/30/2020    LABGLOM 44 (A) 02/24/2021    GFRAA 54 (A) 02/24/2021    AGRATIO 1.7 07/30/2020    GLOB 3.0 07/30/2020         No results found for: CHOL  No results found for: TRIG  No results found for: HDL  No results found for: LDLCHOLESTEROL, LDLCALC  No results found for: LABVLDL, VLDL  No results found for: [facility]    Lab Results   Component Value Date    INR 0.99 02/24/2021    INR 1.02 07/30/2020    PROTIME 11.5 02/24/2021    PROTIME 11.8 07/30/2020       The ASCVD Risk score (Melani Newton et al., 2013) failed to calculate for the following reasons:    Cannot find a previous HDL lab    Cannot find a previous total cholesterol lab    Unable to determine if patient is Non-       Imaging:       Last ECG (if available):  Paced    Last Monitor/Holter    Last Stress (if available):    Last Cath (if available): 7/30/20  Angiographic Findings:  Co dominant system  Left Main:  Normal     Left Anterior Descending:  Mild irregular plaque     Circumflex:  Mild irregular plaque     Right Coronary:  Mild irregular plaque     Left Ventriculogram:  LVEF ~40%. Distal anterior wall/apical dysfunction     Hemodynamics (mm Hg):  Left Ventricular Pressure:  143/2, 3  Central Aortic Pressure:  147/56 (87)     Conclusions:  -No obstructive CAD  -LV dysfunction with LVEF ~40% or so. Distal anterior/apical wall motion abnormality    Last TTE/JANIE(if available): 9/17/21  Summary  Limited echo. Left ventricular cavity size is normal. There is mild concentric left ventricular hypertrophy. Left ventricular function appears to be  reduced with an estimated ejection fraction of 40%, perhaps a bit better. Abnormal septal motion. Apical septum appeared severely  hypokinetic. [place] appears severely hypo- to akinetic. The apical lateral wall is severly hypo- to akinetic. Inferior wall appears mildly  Hypokinetic.    1/22/21   Left ventricular cavity size is normal. There is mild concentric left   ventricular hypertrophy. Left ventricular function appears to be reduced   with an estimated ejection fraction of 40-45%. Abnormal septal motion. [place]   appears severely hypo- to akinetic. Inferior wall appears hypokinetic.     7/10/20   Summary   Left ventricular cavity size is normal. There is mild concentric left   ventricular hypertrophy. Overall left ventricular systolic function appears   moderately reduced with an ejection fraction of 35%. Abnormal setpal motion. The apical septum and true apex are severely hypo- to akinetic. The apical   lateral wall is severely hypo- to akinetic. The anterolateral wall is mildly   hypokinetic. The anterior wall appears hypokinetic. Mild mitral regurgitation is present. Mild tricuspid regurgitation. Estimated pulmonary artery systolic pressure is at 27 mmHg assuming a right   atrial pressure of 3 mmHg. Last CMR  (if available):      Assessment / Plan:     Chronic systolic congestive heart failure ([address]  Compensated. Last visit we switch her to Evalene Beams which seems to be helping quite a bit. Insurance may be an issue. If she is unable to obtain Entresto we will need to switch her back to losartan/chlorthalidone. Continue metoprolol   Has ICD for primary prevention    Essential hypertension  Controlled on Entresto metoprolol      I had the opportunity to review the clinical symptoms and presentation of Viraj Esparza. Patient's allergies and medications were reviewed and updated. Patient's past medical, surgical, social and family history were reviewed and updated. Patient's testing including laboratory, ECGs, monitor, imaging (TTE,JANIE,CMR,cath) were reviewed. All questions and concerns were addressed to the patient/family. Alternatives to my treatment were discussed. The note was completed using EMR. Every effort wasmade to ensure accuracy; however, inadvertent computerized transcription errors may be present. Thank you for allowing me to participate in guillermina or Christian Dire R. Virgene Dandy, MD, [facility] [place], [facility]

## 2022-01-31 ENCOUNTER — OFFICE VISIT (OUTPATIENT)
Dept: CARDIOLOGY CLINIC | Age: 72
End: 2022-01-31
Payer: COMMERCIAL

## 2022-01-31 VITALS
WEIGHT: 215 LBS | HEART RATE: 76 BPM | SYSTOLIC BLOOD PRESSURE: 146 MMHG | BODY MASS INDEX: 35.78 KG/M2 | DIASTOLIC BLOOD PRESSURE: 76 MMHG

## 2022-01-31 DIAGNOSIS — I10 ESSENTIAL HYPERTENSION: ICD-10-CM

## 2022-01-31 DIAGNOSIS — I50.22 CHRONIC SYSTOLIC CONGESTIVE HEART FAILURE (HCC): ICD-10-CM

## 2022-01-31 PROCEDURE — 99214 OFFICE O/P EST MOD 30 MIN: CPT | Performed by: INTERNAL MEDICINE

## 2022-01-31 NOTE — ASSESSMENT & PLAN NOTE
Compensated. Last visit we switch her to [facility] which seems to be helping quite a bit. Insurance may be an issue. If she is unable to obtain Entresto we will need to switch her back to losartan/chlorthalidone.   Continue metoprolol   Has ICD for primary prevention

## 2022-02-08 ENCOUNTER — NURSE ONLY (OUTPATIENT)
Dept: CARDIOLOGY CLINIC | Age: 72
End: 2022-02-08
Payer: COMMERCIAL

## 2022-02-08 ENCOUNTER — OFFICE VISIT (OUTPATIENT)
Dept: CARDIOLOGY CLINIC | Age: 72
End: 2022-02-08
Payer: COMMERCIAL

## 2022-02-08 VITALS
BODY MASS INDEX: 35.79 KG/M2 | DIASTOLIC BLOOD PRESSURE: 80 MMHG | HEART RATE: 83 BPM | SYSTOLIC BLOOD PRESSURE: 142 MMHG | WEIGHT: 214.8 LBS | HEIGHT: 65 IN

## 2022-02-08 DIAGNOSIS — I25.10 CORONARY ARTERY DISEASE INVOLVING NATIVE CORONARY ARTERY OF NATIVE HEART WITHOUT ANGINA PECTORIS: ICD-10-CM

## 2022-02-08 DIAGNOSIS — I51.9 LV DYSFUNCTION: ICD-10-CM

## 2022-02-08 DIAGNOSIS — Z95.0 PRESENCE OF CARDIAC RESYNCHRONIZATION THERAPY PACEMAKER (CRT-P): ICD-10-CM

## 2022-02-08 DIAGNOSIS — I10 ESSENTIAL HYPERTENSION: ICD-10-CM

## 2022-02-08 DIAGNOSIS — I50.22 CHRONIC SYSTOLIC CONGESTIVE HEART FAILURE (HCC): ICD-10-CM

## 2022-02-08 DIAGNOSIS — I49.5 SSS (SICK SINUS SYNDROME) (HCC): ICD-10-CM

## 2022-02-08 DIAGNOSIS — I42.8 NICM (NONISCHEMIC CARDIOMYOPATHY) (HCC): ICD-10-CM

## 2022-02-08 DIAGNOSIS — I49.5 SSS (SICK SINUS SYNDROME) (HCC): Primary | ICD-10-CM

## 2022-02-08 DIAGNOSIS — R07.9 CHEST PAIN, UNSPECIFIED TYPE: ICD-10-CM

## 2022-02-08 PROCEDURE — 99214 OFFICE O/P EST MOD 30 MIN: CPT | Performed by: NURSE PRACTITIONER

## 2022-02-08 PROCEDURE — 93281 PM DEVICE PROGR EVAL MULTI: CPT | Performed by: INTERNAL MEDICINE

## 2022-02-08 RX ORDER — ATORVASTATIN CALCIUM 20 MG
TABLET ORAL
COMMUNITY
Start: 2022-01-07 | End: 2022-09-22 | Stop reason: ALTCHOICE

## 2022-02-08 NOTE — PROGRESS NOTES
[address]   Electrophysiology  Office Visit  Date: 2/8/2022    Chief Complaint   Patient presents with    Bradycardia    Hypertension    Cardiomyopathy    Congestive Heart Failure       Cardiac HX: Violet Esposito is a 80 y.o. woman with a h/o HTN, HLD, NICMP, EF 35% (7/2020), LHC showed no obstructive CAD, SSS, s/p single ch MDT PPM ([place] [facility], 3/30/2017), s/p upgrade to MDT CRT-P (2/24/21, Dr. Liseth Frias). Interval History/HPI: Patient is here for follow-up for SSS, PPM management, nonischemic cardiomyopathy. She was recently seen by Dr. Inman Screws and December 2021 and was started on Entresto. Device check today shows BiV pacing 100%, effective 99.9%, AP 67.5%, no arrhythmias noted, OptiVol is elevated, all other parameters stable. Patient denies SOB, BLE edema, weight is actually come down, denies eating foods high in salt content. She does endorse having chest pain that has been off and on for the last few weeks. Describes as a sharp pain in the center of her chest, last for few seconds at a time, does not radiate, occurs both at rest and with activity, denies SOB with these episodes, nothing makes it better/worse. Last angiogram 7/2020. Pt denies palpitations, heart racing, dizziness, lightheadedness. No PND, orthopnea. BP well controlled.       Home medications:   Current Outpatient Medications on File Prior to Visit   Medication Sig Dispense Refill    Cholecalciferol (VITAMIN D3) 125 MCG (5000 UT) CAPS       sacubitril-valsartan (ENTRESTO) 24-26 MG per tablet Take 1 tablet by mouth 2 times daily 60 tablet 5    spironolactone (ALDACTONE) 25 MG tablet Take 0.5 tablets by mouth daily 45 tablet 3    simvastatin (ZOCOR) 40 MG tablet Take 1 tablet by mouth nightly 90 tablet 3    metoprolol succinate (TOPROL XL) 50 MG extended release tablet Take 1 tablet by mouth daily 90 tablet 3    aspirin EC 81 MG EC tablet Take 1 tablet by mouth daily 90 tablet 3    LIPITOR 20 MG tablet  (Patient not taking: Reported on 2/8/2022)       No current facility-administered medications on file prior to visit. Past Medical History:   Diagnosis Date    Hyperlipidemia     Hypertension         Past Surgical History:   Procedure Laterality Date    PACEMAKER PLACEMENT         Family History:  Reviewed. family history is not on file. Review of System:    · Constitutional: No fevers, chills. · Eyes: No visual changes or diplopia. No scleral icterus. · ENT: No Headaches. No mouth sores or sore throat. · Cardiovascular: Yes for chest pain, No for dyspnea on exertion, No for palpitations or No for loss of consciousness. No cough, hemoptysis, No for pleuritic pain, or phlebitis. · Respiratory: No for cough or wheezing. No hematemesis. · Gastrointestinal: No abdominal pain, blood in stools. · Genitourinary: No dysuria, or hematuria. · Musculoskeletal: No gait disturbance,    · Integumentary: No rash or pruritis. · Neurological: No headache, change in muscle strength, numbness or tingling. · Psychiatric: No anxiety, or depression. · Endocrine: No temperature intolerance. No excessive thirst, fluid intake, or urination. · Hem/Lymph: No abnormal bruising or bleeding, blood clots or swollen lymph nodes. · Allergic/Immunologic: No nasal congestion or hives. Physical Examination:  Vitals:    02/08/22 0958   BP: (!) 142/80   Pulse: 83         Wt Readings from Last 3 Encounters:   02/08/22 214 lb 12.8 oz (97.4 kg)   01/31/22 215 lb (97.5 kg)   12/22/21 219 lb 3.2 oz (99.4 kg)       · Constitutional: Oriented. No distress. · Head: Normocephalic and atraumatic. · Mouth/Throat: Oropharynx is clear and moist.   · Eyes: Conjunctivae clear without jaunduice. PERRL. · Neck: Neck supple. No rigidity. No JVD present. · Cardiovascular: Normal rate, regular rhythm, S1&S2. · Pulmonary/Chest: Bilateral respiratory sounds. No wheezes, No rhonchi. · Abdominal: Soft. Bowel sounds present.  No distension, No tenderness. · Musculoskeletal: No tenderness. No edema    · Lymphadenopathy: Has no cervical adenopathy. · Neurological: Alert and oriented. Cranial nerve appears intact, No Gross deficit   · Skin: Skin is warm and dry. No rash noted. · Psychiatric: Has a normal mood, affect and behavior     Labs:  Reviewed. No results for input(s): NA, K, CL, CO2, PHOS, BUN, CREATININE, CA in the last 72 hours. Invalid input(s):  TSH  No results for input(s): WBC, HGB, HCT, MCV, PLT in the last 72 hours. No results found for: CKTOTAL, CKMB, CKMBINDEX, TROPONINI  No results found for: BNP  Lab Results   Component Value Date    PROTIME 11.5 02/24/2021    PROTIME 11.8 07/30/2020    INR 0.99 02/24/2021    INR 1.02 07/30/2020     No results found for: CHOL, HDL, TRIG    ECG: Personally reviewed: BiV pacing, HR 83, , QTc 442    ECHO:  9/2021   Summary   Limited echo. Left ventricular cavity size is normal. There is mild concentric left   ventricular hypertrophy. Left ventricular function appears to be reduced   with an estimated ejection fraction of 40%, perhaps a bit better. Abnormal   septal motion. Apical septum appeared severely hypokinetic. [place] appears   severely hypo- to akinetic. The apical lateral wall is severly hypo- to   akinetic. Inferior wall appears mildly hypokinetic. Signature    Stress Test: n/a    Cardiac Angiography: 7/2020, Dr. Claudia Keller   Angiographic Findings:  Co dominant system  Left Main:  Normal     Left Anterior Descending:  Mild irregular plaque     Circumflex:  Mild irregular plaque     Right Coronary:  Mild irregular plaque     Left Ventriculogram:  LVEF ~40%. Distal anterior wall/apical dysfunction     Hemodynamics (mm Hg):  Left Ventricular Pressure:  143/2, 3  Central Aortic Pressure:  147/56 (87)     Conclusions:  -No obstructive CAD  -LV dysfunction with LVEF ~40% or so.  Distal anterior/apical wall motion abnormality     Recommendations:  -Maximize medical therapy    Problem List: Patient Active Problem List    Diagnosis Date Noted    Chronic systolic congestive heart failure ([facility] 12/22/2021    Pacemaker     NICM (nonischemic cardiomyopathy) ([facility] [address] 02/24/2021    LV dysfunction     Presence of cardiac resynchronization therapy pacemaker (CRT-P)-MEDTRONIC 07/09/2020    Essential hypertension 06/30/2020    Mixed hyperlipidemia 06/30/2020    SSS (sick sinus syndrome) ([facility] [address] 06/30/2020    Leg edema 06/30/2020        Assessment:   1. SSS (sick sinus syndrome) ([facility] [address]    2. [facility]    3. Presence of cardiac resynchronization therapy pacemaker (CRT-P)    4. Chronic systolic congestive heart failure ([facility]    5. Essential hypertension    6. Coronary artery disease involving native coronary artery of native heart without angina pectoris    7. Chest pain, unspecified type        SSS  - S/p single-chamber MDT PPM, s/p upgrade to a MDT CRT-P  - Device check today shows BiV pacing 100%, effective 99.9%, AP 67.5%, no arrhythmias noted, OptiVol is elevated, all other parameters stable  - Follow up 6 months  - ECG ordered and results personally reviewed      NICMP/chronic sCHF  - Appears to be euvolemic  - EF as low as 35%, now 40%  - NYHA class II  -  paced  - On Entresto 24-26 mg twice daily, spironolactone 12.5 mg daily, Toprol 50 mg daily  - Reviewed Labs 1/2022  - Follows w/ Dr. Baldemar Allen     HTN  - BP fairly well controlled  - On Entresto 24-26 mg twice daily, spironolactone 12.5 mg daily, Toprol 50 mg daily     Nonobstructive CAD  - Endorses CP  - On ASA, statin  - Last [facility] 7/2020  - Will order stress test    EF of 22%   ARB for systolic HF  ASA and Statin for CAD  No known AF   No Tobacco use. All questions and concerns were addressed to the patient/family. Alternatives to my treatment were discussed. The note was completed using EMR. Every effort was made to ensure accuracy; however, inadvertent computerized transcription errors may be present. Patient received education regarding their diagnosis, treatment and medications while in the office today.       Jesus Cisse, [address]

## 2022-02-08 NOTE — PROGRESS NOTES
Patient comes in for programming evaluation on their Medtronic CRTP. Last remote 1.22.2022    All sensing and pacing parameters are within normal range. Underlying V dependent @ 30 bpm in VVI mode. Battery life 9.7 years  AP 67.5%.  100%. Eff 99.9%   %     VSRp <0.1%  VS <0.1%  AT/AF burden 0%  0 monitored/treated episodes since 8. 3.2021.  0 EffectivCRT   2 VS episodes. Recent 9.18.2021 x 08 secs. Appears pseudofused beats. Patient remains on asa 81 mg, metoprolol, entresto. Turned on <90% carelink alert. Please see interrogation for more detail. Optivol fluid accumulation 1.16.22-ongoing with decreasing TI trend. Does not apepar to be on a diuretic. RN spoke to pt's son on 1.25.2022 and RXs sent to [facility]. Entresto samples given w/a copay card. Patient seen by Giselle Ferreira on 1.31.2022. Patient will see NPWS today and follow up in 3 months in office or remotely.

## 2022-02-25 ENCOUNTER — HOSPITAL ENCOUNTER (OUTPATIENT)
Dept: NON INVASIVE DIAGNOSTICS | Age: 72
Discharge: HOME | End: 2022-02-25

## 2022-02-25 ENCOUNTER — TELEPHONE (OUTPATIENT)
Dept: CARDIOLOGY CLINIC | Age: 72
End: 2022-02-25

## 2022-02-25 DIAGNOSIS — R07.9 CHEST PAIN, UNSPECIFIED TYPE: ICD-10-CM

## 2022-02-25 LAB
LV EF: 68 %
LVEF MODALITY: NORMAL

## 2022-02-25 PROCEDURE — A9502 TC99M TETROFOSMIN: HCPCS | Performed by: NURSE PRACTITIONER

## 2022-02-25 PROCEDURE — 78452 HT MUSCLE IMAGE SPECT MULT: CPT

## 2022-02-25 PROCEDURE — 93017 CV STRESS TEST TRACING ONLY: CPT

## 2022-02-25 PROCEDURE — 6360000002 HC RX W HCPCS: Performed by: NURSE PRACTITIONER

## 2022-02-25 PROCEDURE — 2580000003 HC RX 258: Performed by: NURSE PRACTITIONER

## 2022-02-25 PROCEDURE — 3430000000 HC RX DIAGNOSTIC RADIOPHARMACEUTICAL: Performed by: NURSE PRACTITIONER

## 2022-02-25 RX ORDER — SODIUM CHLORIDE 0.9 % (FLUSH) 0.9 %
10 SYRINGE (ML) INJECTION PRN
Status: DISCONTINUED | OUTPATIENT
Start: 2022-02-25 | End: 2022-02-26 | Stop reason: HOSPADM

## 2022-02-25 RX ADMIN — TETROFOSMIN 30 MILLICURIE: 1.38 INJECTION, POWDER, LYOPHILIZED, FOR SOLUTION INTRAVENOUS at 11:16

## 2022-02-25 RX ADMIN — TETROFOSMIN 10 MILLICURIE: 1.38 INJECTION, POWDER, LYOPHILIZED, FOR SOLUTION INTRAVENOUS at 10:27

## 2022-02-25 RX ADMIN — REGADENOSON 0.4 MG: 0.08 INJECTION, SOLUTION INTRAVENOUS at 11:00

## 2022-02-25 RX ADMIN — Medication 10 ML: at 10:27

## 2022-02-25 RX ADMIN — Medication 10 ML: at 11:17

## 2022-02-25 NOTE — TELEPHONE ENCOUNTER
Spoke w/Jose. Denies that pt has fever, no LE Edema, no chest pain, will start daily weights to ck fluid gain if any. States for the most part cough is dry, 'sometimes a little mucous'. Will continue entresto until he hears from [place] on Monday. Felipe [place] states that these symptoms were present before and went away when pt stopped entresto. Advised to call office with any changes or concerning symptoms. Amenable to plan.

## 2022-02-25 NOTE — TELEPHONE ENCOUNTER
The patient's son Babette Cogan called stating the patient is having bad side effects from the [facility] medication she was prescribed ( SOB, Fatigue, and muscle aches). Milagros Cogan states the patient has had these symptoms for over 2 weeks. The patient stopped taking the medication for a few days and the symptoms went away. Please call Babette Cogan at [phone] to advise.

## 2022-02-28 NOTE — TELEPHONE ENCOUNTER
Patient still having issues with side effects from entresto and BP is uncontrolled 150/90. Patient would like to go back to losartan. Please advise.

## 2022-03-01 ENCOUNTER — NURSE ONLY (OUTPATIENT)
Dept: CARDIOLOGY CLINIC | Age: 72
End: 2022-03-01
Payer: COMMERCIAL

## 2022-03-01 DIAGNOSIS — I49.5 SSS (SICK SINUS SYNDROME) (HCC): ICD-10-CM

## 2022-03-01 DIAGNOSIS — I50.22 CHRONIC SYSTOLIC CONGESTIVE HEART FAILURE (HCC): ICD-10-CM

## 2022-03-01 DIAGNOSIS — I42.8 NICM (NONISCHEMIC CARDIOMYOPATHY) (HCC): ICD-10-CM

## 2022-03-01 DIAGNOSIS — Z95.0 PRESENCE OF CARDIAC RESYNCHRONIZATION THERAPY PACEMAKER (CRT-P): ICD-10-CM

## 2022-03-01 RX ORDER — LOSARTAN POTASSIUM 100 MG/1
100 TABLET ORAL DAILY
Qty: 90 TABLET | Refills: 3 | Status: SHIPPED | OUTPATIENT
Start: 2022-03-01 | End: 2022-09-22 | Stop reason: SDUPTHER

## 2022-03-01 NOTE — TELEPHONE ENCOUNTER
Called and spoke with son Johan Feldman. Patient was recently started on Entresto and since starting she has had complaints of shortness of breath, fatigue and muscle aches. She had none of these symptoms prior to starting the [facility]. Son would like her to go back on the losartan and chlorthalidone as her blood pressure is rising. She was on 50 mg of losartan along with chlorthalidone. Based on her low EF of 40% I will place her on losartan 100 mg daily. He is going to monitor her blood pressure over the next week and call on Monday. We will increase her Toprol next if her blood pressure remains elevated. We will check a BMP after assessing blood pressure.

## 2022-03-01 NOTE — PROGRESS NOTES
We received remote transmission from patient's CRT-P monitor at home. Transmission shows normal sensing and pacing function. No new arrhythmias/events recorded. Ap 58.7%  BiVp 100%, Effective 100%    Possible Optivol fluid accumulation 01.16.22-ongoing, currently elevated up to 40 w decreasing trend/possible plateau noted; TI has returned to reference line. EP physician will review. See interrogation under cardiology tab in the [address] [place] [address] [address] field for more details. Will continue to monitor remotely.

## 2022-03-02 PROCEDURE — 93297 REM INTERROG DEV EVAL ICPMS: CPT | Performed by: INTERNAL MEDICINE

## 2022-03-02 PROCEDURE — 93294 REM INTERROG EVL PM/LDLS PM: CPT | Performed by: INTERNAL MEDICINE

## 2022-03-02 PROCEDURE — 93296 REM INTERROG EVL PM/IDS: CPT | Performed by: INTERNAL MEDICINE

## 2022-03-09 ENCOUNTER — TELEPHONE (OUTPATIENT)
Dept: CARDIOLOGY CLINIC | Age: 72
End: 2022-03-09

## 2022-03-09 NOTE — TELEPHONE ENCOUNTER
----- Message from SAMMY Mccarty CNP sent at 3/8/2022 10:06 AM EST -----  Stress test abnormal, pt needs f/u  scheduled with Dr. Ant Milligan

## 2022-03-10 ENCOUNTER — OFFICE VISIT (OUTPATIENT)
Dept: CARDIOLOGY CLINIC | Age: 72
End: 2022-03-10
Payer: COMMERCIAL

## 2022-03-10 VITALS
WEIGHT: 212.2 LBS | OXYGEN SATURATION: 96 % | BODY MASS INDEX: 35.31 KG/M2 | DIASTOLIC BLOOD PRESSURE: 80 MMHG | SYSTOLIC BLOOD PRESSURE: 140 MMHG | HEART RATE: 80 BPM

## 2022-03-10 DIAGNOSIS — R07.2 PRECORDIAL PAIN: ICD-10-CM

## 2022-03-10 DIAGNOSIS — I10 ESSENTIAL HYPERTENSION: ICD-10-CM

## 2022-03-10 DIAGNOSIS — I50.22 CHRONIC SYSTOLIC CONGESTIVE HEART FAILURE (HCC): ICD-10-CM

## 2022-03-10 PROCEDURE — 99214 OFFICE O/P EST MOD 30 MIN: CPT | Performed by: INTERNAL MEDICINE

## 2022-03-10 RX ORDER — CARVEDILOL 12.5 MG/1
12.5 TABLET ORAL 2 TIMES DAILY
Qty: 60 TABLET | Refills: 5 | Status: SHIPPED | OUTPATIENT
Start: 2022-03-10 | End: 2022-06-22

## 2022-03-10 RX ORDER — EMPAGLIFLOZIN 10 MG/1
0.5 TABLET, FILM COATED ORAL DAILY
Qty: 30 TABLET | Refills: 5 | Status: SHIPPED | OUTPATIENT
Start: 2022-03-10 | End: 2022-09-22

## 2022-03-10 ASSESSMENT — ENCOUNTER SYMPTOMS
FACIAL SWELLING: 0
CHEST TIGHTNESS: 0
EYE DISCHARGE: 0
ABDOMINAL PAIN: 0
ABDOMINAL DISTENTION: 0
WHEEZING: 0
SHORTNESS OF BREATH: 0
COLOR CHANGE: 0
BLOOD IN STOOL: 0
COUGH: 0
VOMITING: 0
BACK PAIN: 0

## 2022-03-10 NOTE — ASSESSMENT & PLAN NOTE
Uncontrolled today. Continue current medications. Discontinue metoprolol, start carvedilol, further titrate.   We will also add Jardiance

## 2022-03-10 NOTE — ASSESSMENT & PLAN NOTE
Compensated. Had a cough with Entresto. Continue losartan 100.   Given hypertension, discontinue metoprolol, start carvedilol 12.5 twice a day and further titrate  Continue spironolactone  Add Jardiance

## 2022-03-10 NOTE — PROGRESS NOTES
[facility]     Outpatient Cardiology         Chief Complaint   Patient presents with    Congestive Heart Failure     s/p stress test    Hypertension       HPI     Sadia Perez a 80 y.o. female here for follow up for abnormal stress test    Hx of HTN, PPM single chamber placed on 4/30/2017, upgrade to BiV ICD February 0812  Chronic systolic heart failure, compensated, did not tolerate Entresto due to cough. HTN, uncontrolled today, will adjust medications  HLD, statin, tolerating well. Sick sinus syndrome, had upgrade to BiV ICD, working properly. PMH  Past Medical History:   Diagnosis Date    Hyperlipidemia     Hypertension        PSH  Past Surgical History:   Procedure Laterality Date    PACEMAKER PLACEMENT          Social HIstory  Social History     Tobacco Use    Smoking status: Never Smoker    Smokeless tobacco: Never Used   Substance Use Topics    Alcohol use: Never    Drug use: Not on file       Family History  History reviewed. No pertinent family history. Allergies   No Known Allergies    Medications:     Home Medications:  Were reviewed and are listed in nursing record. and/or listed below    Prior to Admission medications    Medication Sig Start Date End Date Taking?  Authorizing Provider   losartan (COZAAR) 100 MG tablet Take 1 tablet by mouth daily 3/1/22  Yes Thurman Peabody, APRN - CNP   Cholecalciferol (VITAMIN D3) 125 MCG (5000 UT) CAPS  1/7/22  Yes Historical Provider, MD   spironolactone (ALDACTONE) 25 MG tablet Take 0.5 tablets by mouth daily 1/26/22  Yes Thurman Peabody, APRN - CNP   simvastatin (ZOCOR) 40 MG tablet Take 1 tablet by mouth nightly 1/26/22  Yes Thurman Peabody, APRN - CNP   metoprolol succinate (TOPROL XL) 50 MG extended release tablet Take 1 tablet by mouth daily 1/26/22  Yes Thurman Peabody, APRN - CNP   aspirin EC 81 MG EC tablet Take 1 tablet by mouth daily 1/26/22  Yes Thurman Peabody, APRN - CNP   LIPITOR 20 MG tablet  1/7/22   Historical Provider, MD        Review of Systems   Constitutional: Negative for activity change, appetite change, diaphoresis, fatigue, fever and unexpected weight change. HENT: Negative for congestion, facial swelling, mouth sores and nosebleeds. Eyes: Negative for discharge and visual disturbance. Respiratory: Negative for cough, chest tightness, shortness of breath and wheezing. Cardiovascular: Negative for chest pain, palpitations and leg swelling. Gastrointestinal: Negative for abdominal distention, abdominal pain, blood in stool and vomiting. Endocrine: Negative for cold intolerance, heat intolerance and polyuria. Genitourinary: Negative for difficulty urinating, dysuria, frequency and hematuria. Musculoskeletal: Negative for back pain, joint swelling, myalgias and neck pain. Skin: Negative for color change, pallor and rash. Allergic/Immunologic: Negative for immunocompromised state. Neurological: Negative for dizziness, syncope, weakness, light-headedness, numbness and headaches. Hematological: Negative for adenopathy. Does not bruise/bleed easily. Psychiatric/Behavioral: Negative for behavioral problems, confusion, decreased concentration and suicidal ideas. The patient is not nervous/anxious. Vitals:    03/10/22 1313   BP: (!) 140/80   Pulse: 80   SpO2:     Weight: 212 lb 3.2 oz (96.3 kg)       Vitals:    03/10/22 1303 03/10/22 1313   BP: (!) 150/80 (!) 140/80   Pulse: 92 80   SpO2: 96%    Weight: 212 lb 3.2 oz (96.3 kg)        BP Readings from Last 3 Encounters:   03/10/22 (!) 140/80   02/08/22 (!) 142/80   01/31/22 (!) 146/76       Wt Readings from Last 3 Encounters:   03/10/22 212 lb 3.2 oz (96.3 kg)   02/08/22 214 lb 12.8 oz (97.4 kg)   01/31/22 215 lb (97.5 kg)       Physical Exam  Constitutional:       General: She is not in acute distress. Appearance: She is well-developed. She is not diaphoretic. HENT:      Head: Normocephalic and atraumatic.    Eyes:      Pupils: Pupils are equal, round, and reactive to light. Neck:      Thyroid: No thyromegaly. Vascular: No JVD. Cardiovascular:      Rate and Rhythm: Normal rate and regular rhythm. Chest Wall: PMI is not displaced. Heart sounds: Normal heart sounds, S1 normal and S2 normal. No murmur heard. No friction rub. No gallop. Pulmonary:      Effort: Pulmonary effort is normal. No respiratory distress. Breath sounds: Normal breath sounds. No stridor. No wheezing or rales. Chest:      Chest wall: No tenderness. Abdominal:      General: Bowel sounds are normal. There is no distension. Palpations: Abdomen is soft. Tenderness: There is no abdominal tenderness. There is no guarding or rebound. Musculoskeletal:         General: No tenderness. Normal range of motion. Cervical back: Normal range of motion. Lymphadenopathy:      Cervical: No cervical adenopathy. Skin:     General: Skin is warm and dry. Findings: No erythema or rash. Neurological:      Mental Status: She is alert and oriented to person, place, and time. Coordination: Coordination normal.   Psychiatric:         Behavior: Behavior normal.         Thought Content:  Thought content normal.         Judgment: Judgment normal.         Labs:       Lab Results   Component Value Date    WBC 4.5 02/24/2021    HGB 13.7 02/24/2021    HCT 39.9 02/24/2021    MCV 98.7 02/24/2021     02/24/2021     Lab Results   Component Value Date     02/24/2021    K 3.8 02/24/2021     02/24/2021    CO2 28 02/24/2021    BUN 22 (H) 02/24/2021    CREATININE 1.2 02/24/2021    GLUCOSE 136 (H) 02/24/2021    CALCIUM 10.5 02/24/2021    PROT 8.0 07/30/2020    LABALBU 5.0 07/30/2020    BILITOT 2.8 (H) 07/30/2020    ALKPHOS 72 07/30/2020    AST 20 07/30/2020    ALT 13 07/30/2020    LABGLOM 44 (A) 02/24/2021    GFRAA 54 (A) 02/24/2021    AGRATIO 1.7 07/30/2020    GLOB 3.0 07/30/2020         No results found for: CHOL  No results found for: TRIG  No results found for: HDL  No results found for: LDLCHOLESTEROL, LDLCALC  No results found for: LABVLDL, VLDL  No results found for: [facility]    Lab Results   Component Value Date    INR 0.99 02/24/2021    INR 1.02 07/30/2020    PROTIME 11.5 02/24/2021    PROTIME 11.8 07/30/2020       The ASCVD Risk score (Jimmy Padilla, et al., 2013) failed to calculate for the following reasons:    Cannot find a previous HDL lab    Cannot find a previous total cholesterol lab    Unable to determine if patient is Non-       Imaging:       Last ECG (if available):  Paced    Last Monitor/Holter    Last Stress (if available): 2/25/22  Summary   Ada Plate is a medium-sized; moderate intensity; fixed defect of the LV apex    that is consistent with infarction. There is no reversible perfusion defect    to suggest ischemia.    The LVEF is 68% with normal LV wall motion.        This is a mild risk cardiac scan due to the fixed perfusion defect. Last Cath (if available): 7/30/20  Angiographic Findings:  Co dominant system  Left Main:  Normal     Left Anterior Descending:  Mild irregular plaque     Circumflex:  Mild irregular plaque     Right Coronary:  Mild irregular plaque     Left Ventriculogram:  LVEF ~40%. Distal anterior wall/apical dysfunction     Hemodynamics (mm Hg):  Left Ventricular Pressure:  143/2, 3  Central Aortic Pressure:  147/56 (87)     Conclusions:  -No obstructive CAD  -LV dysfunction with LVEF ~40% or so. Distal anterior/apical wall motion abnormality    Last TTE/JANIE(if available): 9/17/21  Summary  Limited echo. Left ventricular cavity size is normal. There is mild concentric left ventricular hypertrophy. Left ventricular function appears to be  reduced with an estimated ejection fraction of 40%, perhaps a bit better. Abnormal septal motion. Apical septum appeared severely  hypokinetic. [place] appears severely hypo- to akinetic. The apical lateral wall is severly hypo- to akinetic. Inferior wall appears mildly  Hypokinetic.    1/22/21   Left ventricular cavity size is normal. There is mild concentric left   ventricular hypertrophy. Left ventricular function appears to be reduced   with an estimated ejection fraction of 40-45%. Abnormal septal motion. [place]   appears severely hypo- to akinetic. Inferior wall appears hypokinetic. 7/10/20   Summary   Left ventricular cavity size is normal. There is mild concentric left   ventricular hypertrophy. Overall left ventricular systolic function appears   moderately reduced with an ejection fraction of 35%. Abnormal setpal motion. The apical septum and true apex are severely hypo- to akinetic. The apical   lateral wall is severely hypo- to akinetic. The anterolateral wall is mildly   hypokinetic. The anterior wall appears hypokinetic. Mild mitral regurgitation is present. Mild tricuspid regurgitation. Estimated pulmonary artery systolic pressure is at 27 mmHg assuming a right   atrial pressure of 3 mmHg. Last CMR  (if available):      Assessment / Plan:     Precordial pain  Mostly epigastric, clearly noncardiac in nature. No need for further testing    Chronic systolic congestive heart failure ([address]  Compensated. Had a cough with Entresto. Continue losartan 100. Given hypertension, discontinue metoprolol, start carvedilol 12.5 twice a day and further titrate  Continue spironolactone  Add Jardiance      Essential hypertension  Uncontrolled today. Continue current medications. Discontinue metoprolol, start carvedilol, further titrate. We will also add Jardiance    Follow-up with Danielle Yee in 2 weeks, at that time if hypertensive increase carvedilol to 25 twice a day. Can also increase Jardiance if needed    Follow up in 3 months. I had the opportunity to review the clinical symptoms and presentation of Mathew Ortiz. Patient's allergies and medications were reviewed and updated.  Patient's past medical, surgical, social and family history were reviewed and updated. Patient's testing including laboratory, ECGs, monitor, imaging (TTE,JANIE,CMR,cath) were reviewed. All questions and concerns were addressed to the patient/family. Alternatives to my treatment were discussed. The note was completed using EMR. Every effort wasmade to ensure accuracy; however, inadvertent computerized transcription errors may be present. Thank you for allowing me to participate in thecare or Devin Sorenson MD, [facility], [facility]

## 2022-03-18 ENCOUNTER — TELEPHONE (OUTPATIENT)
Dept: CARDIOLOGY CLINIC | Age: 72
End: 2022-03-18

## 2022-03-18 NOTE — TELEPHONE ENCOUNTER
Pt son called to see about getting cardiac clearance for upcoming procedure on 3/23/22. CARDIAC CLEARANCE     What type of procedure are you having? Vaginal hysterectomy  Possible vaginal bilateral salpingo- oophorectomy  Vaginal vault suspension  Anterior repair  Posterior repair  Suburethral sling  Transobturator tape  Cystoscopy     Which physician is performing your procedure? Dr. Kingsley Torres    When is your procedure scheduled for?    3/23/22    Where are you having this procedure? [address]    Are you taking Blood Thinners? If so what? (Name/dose/frequesncy)    Aspirin 81 mg    Does the surgeon want you to stop your blood thinner? If so for how long?     Phone Number and Contact Name for Physicians office:    p [phone]  f. [phone]

## 2022-03-18 NOTE — TELEPHONE ENCOUNTER
Efren Gutiérrez, sorry to be a bother - see attached. Last OV 10Mar. Ok to have surgery? Thank you.  Jer Forbes

## 2022-03-31 NOTE — TELEPHONE ENCOUNTER
Can we reach out to her son and see what her blood pressure is doing since I stopped her Entresto and placed her on losartan?

## 2022-04-01 NOTE — TELEPHONE ENCOUNTER
Spoke w/neal - he will send BP readings. States pt 'much better', BP range ~ 120s s, 60-80 d. Confirmed taking losartan 100 mg daily.

## 2022-04-07 ENCOUNTER — TELEPHONE (OUTPATIENT)
Dept: CARDIOLOGY CLINIC | Age: 72
End: 2022-04-07

## 2022-04-07 NOTE — TELEPHONE ENCOUNTER
Received BP log via secure email from son, Trina Bourgeois. Per Rashi Shaw, BP readings within range;   Continue  taking:  Coreg 12.5 mg twice daily  Losartan 100 mg daily  Spironolactone 12.5 mg daily    Metoprolol was dc'd , do not take. States not taking Jardiance - ordered by dr Celia Grullon - requested call back regarding stopping this medication. Information above sent to son via secure email and left detailed voice message on phone.

## 2022-04-18 NOTE — TELEPHONE ENCOUNTER
The patient's son To Simmons call back regarding the previous message. To Simmons would like to discuss the patient's medications more in detail.  [phone]

## 2022-04-20 NOTE — TELEPHONE ENCOUNTER
Son states Kalli Royalty. Audra Goode MD, Urogynecology, [facility], is concerned about kidney function, recent Cr 1.57 (4/4),  Cr 1.02 (1/6) and HTN medications. Requested we review current meds to see if can replace with less nephrotoxic meds. Will evaluate and follow up with son.

## 2022-04-26 ENCOUNTER — NURSE ONLY (OUTPATIENT)
Dept: CARDIOLOGY CLINIC | Age: 72
End: 2022-04-26
Payer: COMMERCIAL

## 2022-04-26 DIAGNOSIS — I50.22 CHRONIC SYSTOLIC CONGESTIVE HEART FAILURE (HCC): ICD-10-CM

## 2022-04-26 DIAGNOSIS — Z95.0 PACEMAKER: ICD-10-CM

## 2022-04-26 DIAGNOSIS — I42.8 NICM (NONISCHEMIC CARDIOMYOPATHY) (HCC): ICD-10-CM

## 2022-04-26 DIAGNOSIS — I49.5 SSS (SICK SINUS SYNDROME) (HCC): ICD-10-CM

## 2022-04-26 NOTE — PROGRESS NOTES
We received remote transmission from patient's CRT-P monitor at home. Transmission shows normal sensing and pacing function. No new arrhythmias/events recorded. Ap 44.8%  BiVp 100%, Effective 100%    Possible Optivol fluid accumulation 01.16-02.28.22, resolved and WNL w TI back to reference line. NP will review. See interrogation under cardiology tab in the [address] [place] [address] [address] field for more details. Will continue to monitor remotely.

## 2022-04-27 DIAGNOSIS — I42.8 NICM (NONISCHEMIC CARDIOMYOPATHY) (HCC): Primary | ICD-10-CM

## 2022-04-27 PROCEDURE — 93297 REM INTERROG DEV EVAL ICPMS: CPT | Performed by: NURSE PRACTITIONER

## 2022-04-27 PROCEDURE — G2066 INTER DEVC REMOTE 30D: HCPCS | Performed by: NURSE PRACTITIONER

## 2022-04-27 NOTE — TELEPHONE ENCOUNTER
Called and spoke with son. Patient had a recent Cr of 1.57. Repeat at 1 week was 1.54. She had been 1.2. She had been on losartan 100 mg daily and was switched to [facility]. She did not tolerate the Entresto. She was placed back on losartan. She did see her urogynecologist who ordered some lab work and noted that her creatinine had gone from 1.2 up to 1.57. She did have a repeat creatinine that came down to 1.54. I called and talked with her son and we are going to cut the losartan in half to 50 mg daily. We will recheck a BMP in 7 to 10 days. If we see some reduction with the decrease in ARB then we will consider switching to hydralazine. She does have a cardiomyopathy and we are using these medications to increase her heart function. This was explained to her son. Lab work has been ordered.

## 2022-05-17 RX ORDER — SIMVASTATIN 40 MG
40 TABLET ORAL NIGHTLY
Qty: 90 TABLET | Refills: 3 | Status: SHIPPED | OUTPATIENT
Start: 2022-05-17 | End: 2022-09-22 | Stop reason: SDUPTHER

## 2022-05-17 NOTE — TELEPHONE ENCOUNTER
Requested Prescriptions     Pending Prescriptions Disp Refills    simvastatin (ZOCOR) 40 MG tablet [Pharmacy Med Name: Simvastatin 40 MG Oral Tablet] 90 tablet 0     Sig: Take 1 tablet by mouth nightly          Number:90  Refills: 3    Last Office Visit: 12/22/2021     Next Office Visit: 6/22/22

## 2022-05-31 ENCOUNTER — NURSE ONLY (OUTPATIENT)
Dept: CARDIOLOGY CLINIC | Age: 72
End: 2022-05-31
Payer: COMMERCIAL

## 2022-05-31 DIAGNOSIS — I42.8 NICM (NONISCHEMIC CARDIOMYOPATHY) (HCC): ICD-10-CM

## 2022-05-31 DIAGNOSIS — Z95.0 PACEMAKER: ICD-10-CM

## 2022-05-31 DIAGNOSIS — I50.22 CHRONIC SYSTOLIC CONGESTIVE HEART FAILURE (HCC): ICD-10-CM

## 2022-05-31 RX ORDER — SPIRONOLACTONE 25 MG/1
TABLET ORAL
Qty: 45 TABLET | Refills: 3 | Status: SHIPPED | OUTPATIENT
Start: 2022-05-31 | End: 2022-09-22 | Stop reason: SDUPTHER

## 2022-05-31 NOTE — PROGRESS NOTES
We received remote transmission from patient's CRT-P monitor at home. Transmission shows normal sensing and pacing function. No new arrhythmias/events recorded. Ap 58.6%  BiVp 99.9%, Effective 99.9%    Optivol is within normal range w elevation noted up to 40 (below 60 threshold) w TI trend slightly below reference line. EP will review. See interrogation under cardiology tab in the [address] [place] [address] [address] field for more details. Will continue to monitor remotely.

## 2022-06-01 PROCEDURE — 93294 REM INTERROG EVL PM/LDLS PM: CPT | Performed by: INTERNAL MEDICINE

## 2022-06-01 PROCEDURE — 93296 REM INTERROG EVL PM/IDS: CPT | Performed by: INTERNAL MEDICINE

## 2022-06-01 PROCEDURE — 93297 REM INTERROG DEV EVAL ICPMS: CPT | Performed by: INTERNAL MEDICINE

## 2022-06-15 ASSESSMENT — ENCOUNTER SYMPTOMS
ABDOMINAL DISTENTION: 0
VOMITING: 0
COUGH: 0
BLOOD IN STOOL: 0
COLOR CHANGE: 0
EYE DISCHARGE: 0
SHORTNESS OF BREATH: 0
WHEEZING: 0
CHEST TIGHTNESS: 0
BACK PAIN: 0
FACIAL SWELLING: 0
ABDOMINAL PAIN: 0

## 2022-06-15 NOTE — PROGRESS NOTES
[address] [facility]     Outpatient Cardiology         Chief Complaint   Patient presents with    Congestive Heart Failure    Foot Swelling     when sitting down for a longer period of time    Shortness of Breath     exertional, specially when walking    Hypertension       HPI     Rizwana Baxter a 71 y.o. female here for follow up for HTN and HFrEF. Hx of HTN, PPM single chamber placed on 4/30/2017, upgrade to BiV ICD February 7956  Chronic systolic heart failure, compensated, did not tolerate Entresto due to cough on cost, did not tolerate Jardiance as well. Currently doing okay. HTN, still elevated, will adjust medications  HLD, statin, tolerating well. Sick sinus syndrome, had upgrade to BiV ICD, working properly. PMH  Past Medical History:   Diagnosis Date    Hyperlipidemia     Hypertension        PSH  Past Surgical History:   Procedure Laterality Date    PACEMAKER PLACEMENT          Social HIstory  Social History     Tobacco Use    Smoking status: Never Smoker    Smokeless tobacco: Never Used   Substance Use Topics    Alcohol use: Never    Drug use: Not on file       Family History  History reviewed. No pertinent family history. Allergies   No Known Allergies    Medications:     Home Medications:  Were reviewed and are listed in nursing record. and/or listed below    Prior to Admission medications    Medication Sig Start Date End Date Taking?  Authorizing Provider   carvedilol (COREG) 25 MG tablet Take 1 tablet by mouth 2 times daily 6/22/22  Yes Mely Mehta MD   spironolactone (ALDACTONE) 25 MG tablet Take 1/2 (one-half) tablet by mouth once daily 5/31/22  Yes Mely Mehta MD   simvastatin (ZOCOR) 40 MG tablet Take 1 tablet by mouth nightly 5/17/22  Yes Mely Mehta MD   losartan (COZAAR) 100 MG tablet Take 1 tablet by mouth daily 3/1/22  Yes SAMMY Underwood - CNP   Cholecalciferol (VITAMIN D3) 125 MCG (5000 UT) CAPS  1/7/22  Yes Historical Provider, MD   aspirin EC 81 MG EC tablet Take 1 tablet by mouth daily 1/26/22  Yes Jan Navarro, APRN - CNP   empagliflozin (JARDIANCE) 10 MG tablet Take 0.5 tablets by mouth daily  Patient not taking: Reported on 6/22/2022 3/10/22   Eduardo Godoy MD   LIPITOR 20 MG tablet  1/7/22   Historical Provider, MD        Review of Systems   Constitutional: Negative for activity change, appetite change, diaphoresis, fatigue, fever and unexpected weight change. HENT: Negative for congestion, facial swelling, mouth sores and nosebleeds. Eyes: Negative for discharge and visual disturbance. Respiratory: Negative for cough, chest tightness, shortness of breath and wheezing. Cardiovascular: Negative for chest pain, palpitations and leg swelling. Gastrointestinal: Negative for abdominal distention, abdominal pain, blood in stool and vomiting. Endocrine: Negative for cold intolerance, heat intolerance and polyuria. Genitourinary: Negative for difficulty urinating, dysuria, frequency and hematuria. Musculoskeletal: Negative for back pain, joint swelling, myalgias and neck pain. Skin: Negative for color change, pallor and rash. Allergic/Immunologic: Negative for immunocompromised state. Neurological: Negative for dizziness, syncope, weakness, light-headedness, numbness and headaches. Hematological: Negative for adenopathy. Does not bruise/bleed easily. Psychiatric/Behavioral: Negative for behavioral problems, confusion, decreased concentration and suicidal ideas. The patient is not nervous/anxious.         Vitals:    06/22/22 1201   BP: (!) 150/90   Pulse: 78    Weight: 211 lb 9.6 oz (96 kg)       Vitals:    06/22/22 1156 06/22/22 1201   BP: (!) 162/90 (!) 150/90   Site: Right Upper Arm    Position: Sitting    Cuff Size: Medium Adult    Pulse: 94 78   Weight: 211 lb 9.6 oz (96 kg)        BP Readings from Last 3 Encounters:   06/22/22 (!) 150/90   03/10/22 (!) 140/80   02/08/22 (!) 142/80       Wt Readings from Last 3 Encounters:   06/22/22 211 lb 9.6 oz (96 kg)   03/10/22 212 lb 3.2 oz (96.3 kg)   02/08/22 214 lb 12.8 oz (97.4 kg)       Physical Exam  Constitutional:       General: She is not in acute distress. Appearance: She is well-developed. She is not diaphoretic. HENT:      Head: Normocephalic and atraumatic. Eyes:      Pupils: Pupils are equal, round, and reactive to light. Neck:      Thyroid: No thyromegaly. Vascular: No JVD. Cardiovascular:      Rate and Rhythm: Normal rate and regular rhythm. Chest Wall: PMI is not displaced. Heart sounds: Normal heart sounds, S1 normal and S2 normal. No murmur heard. No friction rub. No gallop. Pulmonary:      Effort: Pulmonary effort is normal. No respiratory distress. Breath sounds: Normal breath sounds. No stridor. No wheezing or rales. Chest:      Chest wall: No tenderness. Abdominal:      General: Bowel sounds are normal. There is no distension. Palpations: Abdomen is soft. Tenderness: There is no abdominal tenderness. There is no guarding or rebound. Musculoskeletal:         General: No tenderness. Normal range of motion. Cervical back: Normal range of motion. Lymphadenopathy:      Cervical: No cervical adenopathy. Skin:     General: Skin is warm and dry. Findings: No erythema or rash. Neurological:      Mental Status: She is alert and oriented to person, place, and time. Coordination: Coordination normal.   Psychiatric:         Behavior: Behavior normal.         Thought Content:  Thought content normal.         Judgment: Judgment normal.         Labs:       Lab Results   Component Value Date    WBC 4.5 02/24/2021    HGB 13.7 02/24/2021    HCT 39.9 02/24/2021    MCV 98.7 02/24/2021     02/24/2021     Lab Results   Component Value Date     02/24/2021    K 3.8 02/24/2021     02/24/2021    CO2 28 02/24/2021    BUN 22 (H) 02/24/2021    CREATININE 1.2 02/24/2021    GLUCOSE 136 (H) 02/24/2021    CALCIUM 10.5 02/24/2021    PROT 8.0 07/30/2020    LABALBU 5.0 07/30/2020    BILITOT 2.8 (H) 07/30/2020    ALKPHOS 72 07/30/2020    AST 20 07/30/2020    ALT 13 07/30/2020    LABGLOM 44 (A) 02/24/2021    GFRAA 54 (A) 02/24/2021    AGRATIO 1.7 07/30/2020    GLOB 3.0 07/30/2020         No results found for: CHOL  No results found for: TRIG  No results found for: HDL  No results found for: LDLCHOLESTEROL, LDLCALC  No results found for: LABVLDL, VLDL  No results found for: [facility]    Lab Results   Component Value Date    INR 0.99 02/24/2021    INR 1.02 07/30/2020    PROTIME 11.5 02/24/2021    PROTIME 11.8 07/30/2020       The ASCVD Risk score (Di Champagne., et al., 2013) failed to calculate for the following reasons:    Cannot find a previous HDL lab    Cannot find a previous total cholesterol lab    Unable to determine if patient is Non-       Imaging:       Last ECG (if available):  Paced    Last Monitor/Holter    Last Stress (if available): 2/25/22  Summary   Kayleigh Marla is a medium-sized; moderate intensity; fixed defect of the LV apex    that is consistent with infarction. There is no reversible perfusion defect    to suggest ischemia.    The LVEF is 68% with normal LV wall motion.        This is a mild risk cardiac scan due to the fixed perfusion defect. Last Cath (if available): 7/30/20  Angiographic Findings:  Co dominant system  Left Main:  Normal     Left Anterior Descending:  Mild irregular plaque     Circumflex:  Mild irregular plaque     Right Coronary:  Mild irregular plaque     Left Ventriculogram:  LVEF ~40%. Distal anterior wall/apical dysfunction     Hemodynamics (mm Hg):  Left Ventricular Pressure:  143/2, 3  Central Aortic Pressure:  147/56 (87)     Conclusions:  -No obstructive CAD  -LV dysfunction with LVEF ~40% or so. Distal anterior/apical wall motion abnormality    Last TTE/JANIE(if available): 9/17/21  Summary  Limited echo.   Left ventricular cavity months. I had the opportunity to review the clinical symptoms and presentation of Lucinda Jung. Patient's allergies and medications were reviewed and updated. Patient's past medical, surgical, social and family history were reviewed and updated. Patient's testing including laboratory, ECGs, monitor, imaging (TTE,JANIE,CMR,cath) were reviewed. All questions and concerns were addressed to the patient/family. Alternatives to my treatment were discussed. The note was completed using EMR. Every effort wasmade to ensure accuracy; however, inadvertent computerized transcription errors may be present. Thank you for allowing me to participate in thecare or Arjun Justice MD, [facility], [facility]

## 2022-06-20 NOTE — TELEPHONE ENCOUNTER
I had talked to son a month ago and we were cutting back her losartan. He was to have her get labs done. Can you see if they got them done?

## 2022-06-22 ENCOUNTER — OFFICE VISIT (OUTPATIENT)
Dept: CARDIOLOGY CLINIC | Age: 72
End: 2022-06-22
Payer: COMMERCIAL

## 2022-06-22 VITALS
DIASTOLIC BLOOD PRESSURE: 90 MMHG | BODY MASS INDEX: 35.21 KG/M2 | HEART RATE: 78 BPM | WEIGHT: 211.6 LBS | SYSTOLIC BLOOD PRESSURE: 150 MMHG

## 2022-06-22 DIAGNOSIS — I50.22 CHRONIC SYSTOLIC CONGESTIVE HEART FAILURE (HCC): ICD-10-CM

## 2022-06-22 DIAGNOSIS — I10 ESSENTIAL HYPERTENSION: ICD-10-CM

## 2022-06-22 DIAGNOSIS — Z95.0 PRESENCE OF CARDIAC RESYNCHRONIZATION THERAPY PACEMAKER (CRT-P): ICD-10-CM

## 2022-06-22 PROCEDURE — 99214 OFFICE O/P EST MOD 30 MIN: CPT | Performed by: INTERNAL MEDICINE

## 2022-06-22 PROCEDURE — 1123F ACP DISCUSS/DSCN MKR DOCD: CPT | Performed by: INTERNAL MEDICINE

## 2022-06-22 RX ORDER — CARVEDILOL 25 MG/1
25 TABLET ORAL 2 TIMES DAILY
Qty: 60 TABLET | Refills: 5 | Status: SHIPPED | OUTPATIENT
Start: 2022-06-22 | End: 2022-09-22 | Stop reason: SDUPTHER

## 2022-06-22 NOTE — ASSESSMENT & PLAN NOTE
Nonischemic. Did not like side effects of Jardiance. Discontinue. Entresto cost was a problem. Currently on losartan. Continue Aldactone. Increase carvedilol to 25 twice a day.

## 2022-07-08 ENCOUNTER — TELEPHONE (OUTPATIENT)
Dept: CARDIOLOGY CLINIC | Age: 72
End: 2022-07-08

## 2022-07-08 DIAGNOSIS — I10 ESSENTIAL HYPERTENSION: Primary | ICD-10-CM

## 2022-07-08 RX ORDER — HYDRALAZINE HYDROCHLORIDE 25 MG/1
25 TABLET, FILM COATED ORAL 2 TIMES DAILY
Qty: 60 TABLET | Refills: 3 | Status: SHIPPED | OUTPATIENT
Start: 2022-07-08 | End: 2022-07-19 | Stop reason: SDUPTHER

## 2022-07-14 ENCOUNTER — TELEPHONE (OUTPATIENT)
Dept: CARDIOLOGY CLINIC | Age: 72
End: 2022-07-14

## 2022-07-14 NOTE — TELEPHONE ENCOUNTER
Does not appear to be a pacemaker issue however they can send a remote if they wish. They may want to test for Covid. She follows with Lord Crawford next week in office to discuss BP.

## 2022-07-14 NOTE — TELEPHONE ENCOUNTER
PT son Saima Loud states he would like a call back  about pt pacemaker for example is it possible for the pacemaker to not work ?  Please call [phone]

## 2022-07-14 NOTE — TELEPHONE ENCOUNTER
Spoke w/son at length; states past 2-3 weeks pt has had MCNEILL that comes and goes, during day and at nite wakes up due to SOB. Sleeps with pillows to elevate head. Denies covid, has not tested. + fever, + cough that is productive of phlegm, clear. Denies CP, no edema. No abd bloating. BP, today: 294/93, states systolic BP running 796Z - 160s consistently ; states taking all medications as directed. Will review w/NP and follow up with son. OV w/C Flor Delatorre 7/19.

## 2022-07-19 ENCOUNTER — TELEPHONE (OUTPATIENT)
Dept: CARDIOLOGY CLINIC | Age: 72
End: 2022-07-19

## 2022-07-19 ENCOUNTER — OFFICE VISIT (OUTPATIENT)
Dept: CARDIOLOGY CLINIC | Age: 72
End: 2022-07-19
Payer: COMMERCIAL

## 2022-07-19 VITALS
OXYGEN SATURATION: 98 % | HEART RATE: 88 BPM | HEIGHT: 65 IN | SYSTOLIC BLOOD PRESSURE: 148 MMHG | DIASTOLIC BLOOD PRESSURE: 82 MMHG | WEIGHT: 211.6 LBS | BODY MASS INDEX: 35.25 KG/M2

## 2022-07-19 DIAGNOSIS — I50.22 CHRONIC SYSTOLIC CONGESTIVE HEART FAILURE (HCC): Primary | ICD-10-CM

## 2022-07-19 DIAGNOSIS — E78.2 MIXED HYPERLIPIDEMIA: ICD-10-CM

## 2022-07-19 DIAGNOSIS — I42.8 NICM (NONISCHEMIC CARDIOMYOPATHY) (HCC): ICD-10-CM

## 2022-07-19 DIAGNOSIS — I10 ESSENTIAL HYPERTENSION: ICD-10-CM

## 2022-07-19 PROCEDURE — 99214 OFFICE O/P EST MOD 30 MIN: CPT | Performed by: NURSE PRACTITIONER

## 2022-07-19 PROCEDURE — 1123F ACP DISCUSS/DSCN MKR DOCD: CPT | Performed by: NURSE PRACTITIONER

## 2022-07-19 RX ORDER — HYDRALAZINE HYDROCHLORIDE 50 MG/1
50 TABLET, FILM COATED ORAL 2 TIMES DAILY
Qty: 180 TABLET | Refills: 3 | Status: SHIPPED | OUTPATIENT
Start: 2022-07-19 | End: 2022-09-22 | Stop reason: SDUPTHER

## 2022-07-19 NOTE — PROGRESS NOTES
CC CHF follow up    HPI:    71 y.o. patient of Dr Mykel Otto with HFrEF, NICM, HTN, HLD, SSS, and BiV ICD. Last month Dr Mykel Otto increased coreg to 25 mg po bid. She has c/o sharp pains under left breast. She c/o SOB with exertion and occasional orthopnea and PND. She sleep on 2 pillows. She denies weight gain, edema, abdominal bloating or early satiety. She denies n/v/d, fever or GI/ bleeding. Past Medical History:   Diagnosis Date    Hyperlipidemia     Hypertension      Past Surgical History:   Procedure Laterality Date    PACEMAKER PLACEMENT       No family history on file. Social History     Tobacco Use    Smoking status: Never    Smokeless tobacco: Never   Substance Use Topics    Alcohol use: Never     Allergies:Patient has no known allergies. Review of Systems  General: No changes in weight, fatigue, or night sweats. HEENT: No blurry or decreased vision. No changes in hearing, nasal discharge or sore throat. Cardiovascular:  See HPI. Respiratory: No cough, hemoptysis, or wheezing. Gastrointestinal:  No abdominal pain, hematochezia, melana, constipation, diarrhea, or history of GI ulcers. Genito-Urinary: No dysuria or hematuria. No urgency or polyuria. Musculoskeletal:  No complaints of joint pain, joint swelling or muscular weakness/soreness. Neurological:  No dizziness, headaches, numbness/tingling, speech problems or weakness. Psychological:  No anxiety or depression. Hematological and Lymphatic: No abnormal bleeding or bruising, blood clots, jaundice or swollen lymph nodes. Endocrine:   No malaise/lethargy, palpitations, polydipsia/polyuria, temperature intolerance or unexpected weight changes  Skin:  No rashes or non-healing ulcers.     Physical Exam:  BP (!) 148/82   Pulse 88   Ht 5' 5\" (1.651 m)   Wt 211 lb 9.6 oz (96 kg)   SpO2 98%   BMI 35.21 kg/m²    General (appearance):  No acute distress  Eyes: anicteric   Neck: soft, No JVD  Ears/Nose/Mouth/Thorat: No cyanosis  CV: RRR Respiratory:  clear, normal effort  GI: soft, non-tender, non-distended  Skin: Warm, dry. No rashes  Neuro/Psych: Alert and oriented x 3. Appropriate behavior  Ext:  No c/c. No edema  Pulses:  2+ radial     Weight  Wt Readings from Last 3 Encounters:   22 211 lb 9.6 oz (96 kg)   22 211 lb 9.6 oz (96 kg)   03/10/22 212 lb 3.2 oz (96.3 kg)          CBC:   Lab Results   Component Value Date    WBC 4.5 2021    HGB 13.7 2021    HCT 39.9 2021    MCV 98.7 2021     2021     BMP:  Lab Results   Component Value Date    CREATININE 1.2 2021    BUN 22 (H) 2021     2021    K 3.8 2021     2021    CO2 28 2021     Mag: No results found for: MG  LIVER PROFILE:   Lab Results   Component Value Date    ALT 13 2020    AST 20 2020    ALKPHOS 72 2020    BILITOT 2.8 (H) 2020     PT/INR:   Lab Results   Component Value Date    INR 0.99 2021    INR 1.02 2020    PROTIME 11.5 2021    PROTIME 11.8 2020     Pro-BNP No results found for: PROBNP  LIPIDS:  No components found for: CHLPL  No results found for: TRIG  No results found for: HDL  No results found for: LDLCALC  No results found for: LABVLDL  TSH:No results found for: TSH, O1GGDSY, Y7HEPHL, THYROIDAB    IMAGIN/2022 Nuc stress       There is a medium-sized; moderate intensity; fixed defect of the LV apex    that is consistent with infarction. There is no reversible perfusion defect    to suggest ischemia. The LVEF is 68% with normal LV wall motion. This is a mild risk cardiac scan due to the fixed perfusion defect. 2021 Echo:   Limited echo. Left ventricular cavity size is normal. There is mild concentric left   ventricular hypertrophy. Left ventricular function appears to be reduced   with an estimated ejection fraction of 40%, perhaps a bit better. Abnormal   septal motion. Apical septum appeared severely hypokinetic. [place] appears   severely hypo- to akinetic. The apical lateral wall is severly hypo- to   akinetic. Inferior wall appears mildly hypokinetic.     7/2020 Coronary angiogram  Angiographic Findings:  Co dominant system  Left Main:  Normal  Left Anterior Descending:  Mild irregular plaque  Circumflex:  Mild irregular plaque  Right Coronary:  Mild irregular plaque  Left Ventriculogram:  LVEF ~40%. Distal anterior wall/apical dysfunction  Hemodynamics (mm Hg):  Left Ventricular Pressure:  143/2, 3  Central Aortic Pressure:  147/56 (87)  Conclusions:  -No obstructive CAD  -LV dysfunction with LVEF ~40% or so. Distal anterior/apical wall motion abnormality    Medications:   Current Outpatient Medications   Medication Sig Dispense Refill    hydrALAZINE (APRESOLINE) 25 MG tablet Take 1 tablet by mouth 2 times daily 60 tablet 3    carvedilol (COREG) 25 MG tablet Take 1 tablet by mouth 2 times daily 60 tablet 5    spironolactone (ALDACTONE) 25 MG tablet Take 1/2 (one-half) tablet by mouth once daily 45 tablet 3    simvastatin (ZOCOR) 40 MG tablet Take 1 tablet by mouth nightly 90 tablet 3    empagliflozin (JARDIANCE) 10 MG tablet Take 0.5 tablets by mouth daily (Patient not taking: Reported on 6/22/2022) 30 tablet 5    losartan (COZAAR) 100 MG tablet Take 1 tablet by mouth daily 90 tablet 3    LIPITOR 20 MG tablet  (Patient not taking: Reported on 2/8/2022)      Cholecalciferol (VITAMIN D3) 125 MCG (5000 UT) CAPS       aspirin EC 81 MG EC tablet Take 1 tablet by mouth daily 90 tablet 3     No current facility-administered medications for this visit. Assessment:  1. Chronic systolic congestive heart failure ([facility] [address]    2. NICM (nonischemic cardiomyopathy) ([facility] [address]    3. Essential hypertension    4.  Mixed hyperlipidemia        Plan:  Chronic HFrEF/NICM: stable   Coreg 25 mg po bid   Spironolactone 25 mg po daily    Losartan 100 mg po daily    Hydralazine 50 mg po bid    ASA 81 mg po daily    Did not tolerate Jardiance   HTN: acute   /64   Hydralazine increased to 50 mg po bid    Coreg 25 mg po bid   Losartan 100 mg po daily   HLD: stable    Lipitor    Follow up in 2 months         Reviewed most recent: CBC, BMP, LFT, Lipids, Mag, PT/INR, BNP, TSH  Reviewed most recent: ECG, Echo, Nuc stress test, LHC

## 2022-07-27 ENCOUNTER — NURSE ONLY (OUTPATIENT)
Dept: CARDIOLOGY CLINIC | Age: 72
End: 2022-07-27

## 2022-07-27 DIAGNOSIS — Z95.0 PRESENCE OF CARDIAC RESYNCHRONIZATION THERAPY PACEMAKER (CRT-P): ICD-10-CM

## 2022-07-27 DIAGNOSIS — I50.22 CHRONIC SYSTOLIC CONGESTIVE HEART FAILURE (HCC): Primary | ICD-10-CM

## 2022-07-27 DIAGNOSIS — I42.8 NICM (NONISCHEMIC CARDIOMYOPATHY) (HCC): ICD-10-CM

## 2022-07-27 PROCEDURE — 93297 REM INTERROG DEV EVAL ICPMS: CPT | Performed by: CLINICAL NURSE SPECIALIST

## 2022-07-27 PROCEDURE — G2066 INTER DEVC REMOTE 30D: HCPCS | Performed by: CLINICAL NURSE SPECIALIST

## 2022-07-28 NOTE — PROGRESS NOTES
We received remote transmission from patient's CRT-P monitor at home. Transmission shows normal sensing and pacing function. No new arrhythmias/events recorded. Ap 65.4%  BiVp 99.9%, Effective 99.9%    Optivol is within normal range   TriageHF Heart Failure Risk Status on 27-Jul-2022 is Low*    NP will review. See interrogation under cardiology tab in the [address] [place] [address] [address] field for more details. Will continue to monitor remotely.      (End of 31-day monitoring period 7/27/22)

## 2022-09-06 ENCOUNTER — NURSE ONLY (OUTPATIENT)
Dept: CARDIOLOGY CLINIC | Age: 72
End: 2022-09-06

## 2022-09-06 DIAGNOSIS — I42.8 NICM (NONISCHEMIC CARDIOMYOPATHY) (HCC): ICD-10-CM

## 2022-09-06 DIAGNOSIS — I50.22 CHRONIC SYSTOLIC CONGESTIVE HEART FAILURE (HCC): Primary | ICD-10-CM

## 2022-09-06 DIAGNOSIS — Z95.0 PRESENCE OF CARDIAC RESYNCHRONIZATION THERAPY PACEMAKER (CRT-P): ICD-10-CM

## 2022-09-06 PROCEDURE — 93297 REM INTERROG DEV EVAL ICPMS: CPT | Performed by: NURSE PRACTITIONER

## 2022-09-06 PROCEDURE — 93294 REM INTERROG EVL PM/LDLS PM: CPT | Performed by: INTERNAL MEDICINE

## 2022-09-06 PROCEDURE — 93296 REM INTERROG EVL PM/IDS: CPT | Performed by: INTERNAL MEDICINE

## 2022-09-07 NOTE — PROGRESS NOTES
We received remote transmission from patient's CRT-P monitor at home. Transmission shows normal sensing and pacing function. No new arrhythmias/events recorded. Ap 72.4%  BiVp 99.9%, Effective 99.8%    Optivol is within normal range   TriageHF Heart Failure Risk Status on 06-Sep-2022 is Low*    EP/ NP will review. See interrogation under cardiology tab in the [address] [place] [address] [address] field for more details. Will continue to monitor remotely. (End of 91-day monitoring period 9/6/22).

## 2022-09-21 ASSESSMENT — ENCOUNTER SYMPTOMS
EYE DISCHARGE: 0
ABDOMINAL DISTENTION: 0
WHEEZING: 0
ABDOMINAL PAIN: 0
BACK PAIN: 0
COLOR CHANGE: 0
CHEST TIGHTNESS: 0
VOMITING: 0
COUGH: 0
FACIAL SWELLING: 0
SHORTNESS OF BREATH: 0
BLOOD IN STOOL: 0

## 2022-09-21 NOTE — PROGRESS NOTES
[facility]     Outpatient Cardiology         Chief Complaint   Patient presents with    Congestive Heart Failure    Shortness of Breath     intermittent       HPI     Tao Gonzalez a 71 y.o. female here for follow up for HTN and HFrEF. Hx of HTN, PPM single chamber placed on 4/30/2017, upgrade to BiV ICD February 7212  Chronic systolic heart failure,   HTN, controlled on current medications. No need for medication changes today. HLD, statin, tolerating well. Sick sinus syndrome, had upgrade to BiV ICD, working properly. Overall feeling well from a cardiovascular perspective, no symptoms concerning for decompensated heart failure. In the past did not tolerate Entresto/Jardiance. Continue current medications. PMH  Past Medical History:   Diagnosis Date    Hyperlipidemia     Hypertension        PSH  Past Surgical History:   Procedure Laterality Date    PACEMAKER PLACEMENT          Social HIstory  Social History     Tobacco Use    Smoking status: Never    Smokeless tobacco: Never   Substance Use Topics    Alcohol use: Never       Family History  No family history on file. Allergies   No Known Allergies    Medications:     Home Medications:  Were reviewed and are listed in nursing record. and/or listed below    Prior to Admission medications    Medication Sig Start Date End Date Taking? Authorizing Provider   hydrALAZINE (APRESOLINE) 50 MG tablet Take 1 tablet by mouth in the morning and 1 tablet in the evening.  7/19/22  Yes SAMMY Valenzuela - CNP   carvedilol (COREG) 25 MG tablet Take 1 tablet by mouth 2 times daily 6/22/22  Yes Rah Pike MD   spironolactone (ALDACTONE) 25 MG tablet Take 1/2 (one-half) tablet by mouth once daily 5/31/22  Yes Rah Pike MD   simvastatin (ZOCOR) 40 MG tablet Take 1 tablet by mouth nightly 5/17/22  Yes Rah Pike MD   empagliflozin (JARDIANCE) 10 MG tablet Take 0.5 tablets by mouth daily 3/10/22  Yes Alvin MORALES Luigi Serna MD   losartan (COZAAR) 100 MG tablet Take 1 tablet by mouth daily  Patient taking differently: Take 50 mg by mouth daily 3/1/22  Yes SAMMY Lacy CNP   Cholecalciferol (VITAMIN D3) 125 MCG (5000 UT) CAPS  1/7/22  Yes Historical Provider, MD   aspirin EC 81 MG EC tablet Take 1 tablet by mouth daily 1/26/22  Yes SAMMY Lacy CNP        Review of Systems   Constitutional:  Negative for activity change, appetite change, diaphoresis, fatigue, fever and unexpected weight change. HENT:  Negative for congestion, facial swelling, mouth sores and nosebleeds. Eyes:  Negative for discharge and visual disturbance. Respiratory:  Negative for cough, chest tightness, shortness of breath and wheezing. Cardiovascular:  Negative for chest pain, palpitations and leg swelling. Gastrointestinal:  Negative for abdominal distention, abdominal pain, blood in stool and vomiting. Endocrine: Negative for cold intolerance, heat intolerance and polyuria. Genitourinary:  Negative for difficulty urinating, dysuria, frequency and hematuria. Musculoskeletal:  Negative for back pain, joint swelling, myalgias and neck pain. Skin:  Negative for color change, pallor and rash. Allergic/Immunologic: Negative for immunocompromised state. Neurological:  Negative for dizziness, syncope, weakness, light-headedness, numbness and headaches. Hematological:  Negative for adenopathy. Does not bruise/bleed easily. Psychiatric/Behavioral:  Negative for behavioral problems, confusion, decreased concentration and suicidal ideas. The patient is not nervous/anxious.       Vitals:    09/22/22 1410   BP: 130/80   Pulse: 90    Weight: 214 lb 12.8 oz (97.4 kg)       Vitals:    09/22/22 1410   BP: 130/80   Site: Left Upper Arm   Position: Sitting   Cuff Size: Medium Adult   Pulse: 90   Weight: 214 lb 12.8 oz (97.4 kg)       BP Readings from Last 3 Encounters:   09/22/22 130/80   07/19/22 (!) 148/82   06/22/22 (!) 150/90 Wt Readings from Last 3 Encounters:   09/22/22 214 lb 12.8 oz (97.4 kg)   07/19/22 211 lb 9.6 oz (96 kg)   06/22/22 211 lb 9.6 oz (96 kg)       Physical Exam  Constitutional:       General: She is not in acute distress. Appearance: She is well-developed. She is not diaphoretic. HENT:      Head: Normocephalic and atraumatic. Eyes:      Pupils: Pupils are equal, round, and reactive to light. Neck:      Thyroid: No thyromegaly. Vascular: No JVD. Cardiovascular:      Rate and Rhythm: Normal rate and regular rhythm. Chest Wall: PMI is not displaced. Heart sounds: Normal heart sounds, S1 normal and S2 normal. No murmur heard. No friction rub. No gallop. Pulmonary:      Effort: Pulmonary effort is normal. No respiratory distress. Breath sounds: Normal breath sounds. No stridor. No wheezing or rales. Chest:      Chest wall: No tenderness. Abdominal:      General: Bowel sounds are normal. There is no distension. Palpations: Abdomen is soft. Tenderness: There is no abdominal tenderness. There is no guarding or rebound. Musculoskeletal:         General: No tenderness. Normal range of motion. Cervical back: Normal range of motion. Lymphadenopathy:      Cervical: No cervical adenopathy. Skin:     General: Skin is warm and dry. Findings: No erythema or rash. Neurological:      Mental Status: She is alert and oriented to person, place, and time. Coordination: Coordination normal.   Psychiatric:         Behavior: Behavior normal.         Thought Content:  Thought content normal.         Judgment: Judgment normal.       Labs:       Lab Results   Component Value Date    WBC 4.5 02/24/2021    HGB 13.7 02/24/2021    HCT 39.9 02/24/2021    MCV 98.7 02/24/2021     02/24/2021     Lab Results   Component Value Date     02/24/2021    K 3.8 02/24/2021     02/24/2021    CO2 28 02/24/2021    BUN 22 (H) 02/24/2021    CREATININE 1.2 02/24/2021 GLUCOSE 136 (H) 02/24/2021    CALCIUM 10.5 02/24/2021    PROT 8.0 07/30/2020    LABALBU 5.0 07/30/2020    BILITOT 2.8 (H) 07/30/2020    ALKPHOS 72 07/30/2020    AST 20 07/30/2020    ALT 13 07/30/2020    LABGLOM 44 (A) 02/24/2021    GFRAA 54 (A) 02/24/2021    AGRATIO 1.7 07/30/2020    GLOB 3.0 07/30/2020         No results found for: CHOL  No results found for: TRIG  No results found for: HDL  No results found for: LDLCHOLESTEROL, LDLCALC  No results found for: LABVLDL, VLDL  No results found for: [facility]    Lab Results   Component Value Date    INR 0.99 02/24/2021    INR 1.02 07/30/2020    PROTIME 11.5 02/24/2021    PROTIME 11.8 07/30/2020       The ASCVD Risk score (Bouchra Fields, et al., 2013) failed to calculate for the following reasons:    Cannot find a previous HDL lab    Cannot find a previous total cholesterol lab    Unable to determine if patient is Non-       Imaging:       Last ECG (if available):  Paced    Last Monitor/Holter    Last Stress (if available): 2/25/22  Summary    There is a medium-sized; moderate intensity; fixed defect of the LV apex    that is consistent with infarction. There is no reversible perfusion defect    to suggest ischemia. The LVEF is 68% with normal LV wall motion. This is a mild risk cardiac scan due to the fixed perfusion defect. Last Cath (if available): 7/30/20  Angiographic Findings:  Co dominant system  Left Main:  Normal     Left Anterior Descending:  Mild irregular plaque     Circumflex:  Mild irregular plaque     Right Coronary:  Mild irregular plaque     Left Ventriculogram:  LVEF ~40%. Distal anterior wall/apical dysfunction     Hemodynamics (mm Hg):  Left Ventricular Pressure:  143/2, 3  Central Aortic Pressure:  147/56 (87)     Conclusions:  -No obstructive CAD  -LV dysfunction with LVEF ~40% or so. Distal anterior/apical wall motion abnormality    Last TTE/JANIE(if available): 9/17/21  Summary  Limited echo.   Left ventricular cavity size is normal. There is mild concentric left ventricular hypertrophy. Left ventricular function appears to be  reduced with an estimated ejection fraction of 40%, perhaps a bit better. Abnormal septal motion. Apical septum appeared severely  hypokinetic. [place] appears severely hypo- to akinetic. The apical lateral wall is severly hypo- to akinetic. Inferior wall appears mildly  Hypokinetic.    1/22/21   Left ventricular cavity size is normal. There is mild concentric left   ventricular hypertrophy. Left ventricular function appears to be reduced   with an estimated ejection fraction of 40-45%. Abnormal septal motion. [place]   appears severely hypo- to akinetic. Inferior wall appears hypokinetic. 7/10/20   Summary   Left ventricular cavity size is normal. There is mild concentric left   ventricular hypertrophy. Overall left ventricular systolic function appears   moderately reduced with an ejection fraction of 35%. Abnormal setpal motion. The apical septum and true apex are severely hypo- to akinetic. The apical   lateral wall is severely hypo- to akinetic. The anterolateral wall is mildly   hypokinetic. The anterior wall appears hypokinetic. Mild mitral regurgitation is present. Mild tricuspid regurgitation. Estimated pulmonary artery systolic pressure is at 27 mmHg assuming a right   atrial pressure of 3 mmHg. Last CMR  (if available):      Assessment / Plan:   Presence of cardiac resynchronization therapy pacemaker (CRT-P)-[employer]  Device working properly. Follow with EP. Chronic systolic congestive heart failure ([address]  Compensated and doing well. Cost issues with Entresto. Did not like Jardiance. Currently on carvedilol, hydralazine, losartan, spironolactone. Continue all these medications. She will be going to [place] and [facility], will refill medications, she is not sure when she is coming back. Follow up in 6 months.     I had the opportunity to review the clinical symptoms and presentation

## 2022-09-22 ENCOUNTER — OFFICE VISIT (OUTPATIENT)
Dept: CARDIOLOGY CLINIC | Age: 72
End: 2022-09-22

## 2022-09-22 VITALS
WEIGHT: 214.8 LBS | DIASTOLIC BLOOD PRESSURE: 80 MMHG | BODY MASS INDEX: 35.74 KG/M2 | SYSTOLIC BLOOD PRESSURE: 130 MMHG | HEART RATE: 90 BPM

## 2022-09-22 DIAGNOSIS — Z95.0 PRESENCE OF CARDIAC RESYNCHRONIZATION THERAPY PACEMAKER (CRT-P): ICD-10-CM

## 2022-09-22 DIAGNOSIS — I10 ESSENTIAL HYPERTENSION: ICD-10-CM

## 2022-09-22 DIAGNOSIS — I50.22 CHRONIC SYSTOLIC CONGESTIVE HEART FAILURE (HCC): ICD-10-CM

## 2022-09-22 PROCEDURE — 1123F ACP DISCUSS/DSCN MKR DOCD: CPT | Performed by: INTERNAL MEDICINE

## 2022-09-22 PROCEDURE — 99214 OFFICE O/P EST MOD 30 MIN: CPT | Performed by: INTERNAL MEDICINE

## 2022-09-22 RX ORDER — HYDRALAZINE HYDROCHLORIDE 50 MG/1
50 TABLET, FILM COATED ORAL 2 TIMES DAILY
Qty: 180 TABLET | Refills: 3 | Status: SHIPPED | OUTPATIENT
Start: 2022-09-22

## 2022-09-22 RX ORDER — ASPIRIN 81 MG/1
81 TABLET ORAL DAILY
Qty: 90 TABLET | Refills: 3 | Status: SHIPPED | OUTPATIENT
Start: 2022-09-22

## 2022-09-22 RX ORDER — CARVEDILOL 25 MG/1
25 TABLET ORAL 2 TIMES DAILY
Qty: 180 TABLET | Refills: 3 | Status: SHIPPED | OUTPATIENT
Start: 2022-09-22

## 2022-09-22 RX ORDER — SIMVASTATIN 40 MG
40 TABLET ORAL NIGHTLY
Qty: 90 TABLET | Refills: 3 | Status: SHIPPED | OUTPATIENT
Start: 2022-09-22

## 2022-09-22 RX ORDER — LOSARTAN POTASSIUM 50 MG/1
50 TABLET ORAL DAILY
Qty: 90 TABLET | Refills: 3 | Status: SHIPPED | OUTPATIENT
Start: 2022-09-22

## 2022-09-22 RX ORDER — SPIRONOLACTONE 25 MG/1
TABLET ORAL
Qty: 90 TABLET | Refills: 3 | Status: SHIPPED | OUTPATIENT
Start: 2022-09-22

## 2022-09-22 NOTE — ASSESSMENT & PLAN NOTE
Compensated and doing well. Cost issues with Entresto. Did not like Jardiance. Currently on carvedilol, hydralazine, losartan, spironolactone. Continue all these medications.

## 2022-12-14 ENCOUNTER — TELEPHONE (OUTPATIENT)
Dept: CARDIOLOGY CLINIC | Age: 72
End: 2022-12-14

## 2022-12-14 NOTE — TELEPHONE ENCOUNTER
Patient needs a device and EP OV appointment scheduled. Last seen 02.2022 by NPWS. Instructed to f/u in 6 months. Also appears her home monitor is disconnected. If she needs assistance with her home monitor, please ask her to contact [address] [place] [address] at [phone].

## 2022-12-14 NOTE — TELEPHONE ENCOUNTER
Contacted patients son Tarik Evans and he stated that the patient is in [place] and [facility] right now but he will contact her and have her call us and schedule the appointment.

## 2023-04-19 DIAGNOSIS — I10 ESSENTIAL HYPERTENSION: ICD-10-CM

## 2023-04-20 RX ORDER — HYDRALAZINE HYDROCHLORIDE 50 MG/1
TABLET, FILM COATED ORAL
Qty: 180 TABLET | Refills: 1 | Status: SHIPPED | OUTPATIENT
Start: 2023-04-20

## 2023-08-07 RX ORDER — ASPIRIN 325 MG
TABLET ORAL
Qty: 30 TABLET | Refills: 0 | Status: SHIPPED | OUTPATIENT
Start: 2023-08-07

## 2023-08-07 RX ORDER — SIMVASTATIN 40 MG
40 TABLET ORAL NIGHTLY
Qty: 30 TABLET | Refills: 0 | Status: SHIPPED | OUTPATIENT
Start: 2023-08-07

## 2023-08-07 RX ORDER — SPIRONOLACTONE 25 MG/1
TABLET ORAL
Qty: 30 TABLET | Refills: 0 | Status: SHIPPED | OUTPATIENT
Start: 2023-08-07

## 2023-08-07 RX ORDER — CARVEDILOL 25 MG/1
TABLET ORAL
Qty: 60 TABLET | Refills: 0 | Status: SHIPPED | OUTPATIENT
Start: 2023-08-07

## 2023-08-07 RX ORDER — LOSARTAN POTASSIUM 50 MG/1
TABLET ORAL
Qty: 30 TABLET | Refills: 0 | Status: SHIPPED | OUTPATIENT
Start: 2023-08-07

## 2023-08-07 NOTE — TELEPHONE ENCOUNTER
Requested Prescriptions     Pending Prescriptions Disp Refills    FAB LOW DOSE 81 MG EC tablet [Pharmacy Med Name: Fab Low Dose 81 MG Oral Tablet Delayed Release] 90 tablet 0     Sig: Take 1 tablet by mouth once daily    carvedilol (COREG) 25 MG tablet [Pharmacy Med Name: Carvedilol 25 MG Oral Tablet] 180 tablet 0     Sig: Take 1 tablet by mouth twice daily    losartan (COZAAR) 50 MG tablet [Pharmacy Med Name: Losartan Potassium 50 MG Oral Tablet] 90 tablet 0     Sig: Take 1 tablet by mouth once daily    simvastatin (ZOCOR) 40 MG tablet [Pharmacy Med Name: Simvastatin 40 MG Oral Tablet] 90 tablet 0     Sig: Take 1 tablet by mouth nightly    spironolactone (ALDACTONE) 25 MG tablet [Pharmacy Med Name: Spironolactone 25 MG Oral Tablet] 90 tablet 0     Sig: Take 1/2 (one-half) tablet by mouth once daily          Number: 30    Refills: 0    Last Office Visit: 9/22/2022     Next Office Visit: Bad #'s Left message on her sons voicemail for her to call the office and schedule an appointment     Last Refill: 09/22/2022    Last Labs:  04/27/2022 BMP

## 2023-12-06 RX ORDER — SPIRONOLACTONE 25 MG/1
12.5 TABLET ORAL DAILY
Qty: 30 TABLET | Refills: 3 | Status: SHIPPED | OUTPATIENT
Start: 2023-12-06

## 2023-12-06 NOTE — TELEPHONE ENCOUNTER
Requested Prescriptions     Pending Prescriptions Disp Refills    spironolactone (ALDACTONE) 25 MG tablet 30 tablet 3          Number: 30    Refills: 0    Last Office Visit: 9/22/2022     Next Office Visit: PHARMACY WILL CALL FOR APPT

## 2023-12-19 NOTE — TELEPHONE ENCOUNTER
Requested Prescriptions      No prescriptions requested or ordered in this encounter            Last Office Visit: 9/22/2022     Next Office Visit: Visit date not found